# Patient Record
Sex: MALE | Race: WHITE | NOT HISPANIC OR LATINO | Employment: FULL TIME | URBAN - NONMETROPOLITAN AREA
[De-identification: names, ages, dates, MRNs, and addresses within clinical notes are randomized per-mention and may not be internally consistent; named-entity substitution may affect disease eponyms.]

---

## 2018-02-02 ENCOUNTER — OFFICE VISIT (OUTPATIENT)
Dept: RETAIL CLINIC | Facility: CLINIC | Age: 58
End: 2018-02-02

## 2018-02-02 VITALS
RESPIRATION RATE: 24 BRPM | WEIGHT: 225 LBS | TEMPERATURE: 99.9 F | HEIGHT: 72 IN | DIASTOLIC BLOOD PRESSURE: 64 MMHG | BODY MASS INDEX: 30.48 KG/M2 | HEART RATE: 99 BPM | OXYGEN SATURATION: 97 % | SYSTOLIC BLOOD PRESSURE: 112 MMHG

## 2018-02-02 DIAGNOSIS — J10.1 INFLUENZA A: Primary | ICD-10-CM

## 2018-02-02 LAB
EXPIRATION DATE: NORMAL
FLUAV AG NPH QL: NORMAL
FLUBV AG NPH QL: NORMAL
INTERNAL CONTROL: NORMAL
Lab: NORMAL

## 2018-02-02 PROCEDURE — 99213 OFFICE O/P EST LOW 20 MIN: CPT | Performed by: NURSE PRACTITIONER

## 2018-02-02 PROCEDURE — 87804 INFLUENZA ASSAY W/OPTIC: CPT | Performed by: NURSE PRACTITIONER

## 2018-02-02 RX ORDER — OSELTAMIVIR PHOSPHATE 75 MG/1
75 CAPSULE ORAL 2 TIMES DAILY
Qty: 10 CAPSULE | Refills: 0 | Status: SHIPPED | OUTPATIENT
Start: 2018-02-02 | End: 2018-02-07

## 2018-02-02 RX ORDER — AMLODIPINE BESYLATE 2.5 MG/1
2.5 TABLET ORAL DAILY
COMMUNITY
End: 2021-02-12 | Stop reason: ALTCHOICE

## 2018-02-02 RX ORDER — IBUPROFEN 800 MG/1
800 TABLET ORAL EVERY 6 HOURS PRN
Qty: 30 TABLET | Refills: 0 | Status: SHIPPED | OUTPATIENT
Start: 2018-02-02 | End: 2021-02-18

## 2018-02-02 RX ORDER — SIMVASTATIN 40 MG
40 TABLET ORAL NIGHTLY
COMMUNITY
End: 2022-05-23

## 2018-02-02 RX ORDER — ASPIRIN 81 MG/1
81 TABLET ORAL DAILY
COMMUNITY
End: 2021-02-12 | Stop reason: SDDI

## 2018-02-02 NOTE — PROGRESS NOTES
"Subjective   Fidel Roberson is a 58 y.o. male.   Chief Complaint   Patient presents with   • Flu Symptoms      Flu Symptoms   This is a new problem. The current episode started yesterday. The problem has been gradually worsening. Associated symptoms include arthralgias, chills, a fever, myalgias and weakness. Pertinent negatives include no rash. The symptoms are aggravated by exertion. He has tried rest for the symptoms. The treatment provided no relief.        The following portions of the patient's history were reviewed and updated as appropriate: allergies, current medications, past family history, past medical history, past social history, past surgical history and problem list.    Current Outpatient Prescriptions:   •  aspirin 81 MG EC tablet, Take 81 mg by mouth Daily., Disp: , Rfl:   •  simvastatin (ZOCOR) 40 MG tablet, Take 40 mg by mouth Every Night., Disp: , Rfl:   •  ibuprofen (ADVIL,MOTRIN) 800 MG tablet, Take 1 tablet by mouth Every 6 (Six) Hours As Needed for Fever., Disp: 30 tablet, Rfl: 0  •  oseltamivir (TAMIFLU) 75 MG capsule, Take 1 capsule by mouth 2 (Two) Times a Day for 5 days., Disp: 10 capsule, Rfl: 0    Review of Systems   Constitutional: Positive for activity change, appetite change, chills and fever.   HENT: Negative.    Eyes: Negative.    Respiratory: Negative.    Cardiovascular: Negative.    Gastrointestinal: Negative.    Musculoskeletal: Positive for arthralgias and myalgias.   Skin: Negative for rash.   Neurological: Positive for weakness.     /64  Pulse 99  Temp 99.9 °F (37.7 °C) (Temporal Artery )   Resp 24  Ht 182.9 cm (72\")  Wt 102 kg (225 lb)  SpO2 97%  BMI 30.52 kg/m2    Objective   No Known Allergies    Physical Exam   Constitutional: He is oriented to person, place, and time. He appears well-developed and well-nourished. He has a sickly appearance. No distress.   HENT:   Head: Normocephalic.   Right Ear: External ear normal.   Left Ear: External ear normal.   Nose: " Nose normal.   Mouth/Throat: Oropharynx is clear and moist. No oropharyngeal exudate.   Eyes: Conjunctivae are normal.   Neck: Normal range of motion. Neck supple. No JVD present. No tracheal deviation present. No thyromegaly present.   Cardiovascular: Normal rate and regular rhythm.  Exam reveals no gallop and no friction rub.    No murmur heard.  Pulmonary/Chest: Effort normal and breath sounds normal. No stridor. No respiratory distress. He has no wheezes. He has no rales. He exhibits no tenderness.   Lymphadenopathy:     He has no cervical adenopathy.   Neurological: He is alert and oriented to person, place, and time.   Skin: Skin is warm. He is not diaphoretic.   Psychiatric: He has a normal mood and affect. His behavior is normal.       Assessment/Plan   Fidel was seen today for flu symptoms.    Diagnoses and all orders for this visit:    Influenza A  -     POC Influenza A / B    Other orders  -     ibuprofen (ADVIL,MOTRIN) 800 MG tablet; Take 1 tablet by mouth Every 6 (Six) Hours As Needed for Fever.  -     oseltamivir (TAMIFLU) 75 MG capsule; Take 1 capsule by mouth 2 (Two) Times a Day for 5 days.      Results for orders placed or performed in visit on 02/02/18   POC Influenza A / B   Result Value Ref Range    Rapid Influenza A Ag POS     Rapid Influenza B Ag NEG     Internal Control Passed Passed    Lot Number [id number]     Expiration Date 5/19           An After Visit Summary was printed, reviewed, and given to the patient. Understanding verbalized and agrees with treatment plan.  If no improvement or becomes worse, follow up with primary or go to [facility]/ER.        February 2, 2018 9:15 AM

## 2019-02-27 ENCOUNTER — OFFICE VISIT (OUTPATIENT)
Dept: RETAIL CLINIC | Facility: CLINIC | Age: 59
End: 2019-02-27

## 2019-02-27 VITALS
WEIGHT: 229.2 LBS | OXYGEN SATURATION: 97 % | DIASTOLIC BLOOD PRESSURE: 80 MMHG | RESPIRATION RATE: 18 BRPM | TEMPERATURE: 100 F | SYSTOLIC BLOOD PRESSURE: 130 MMHG | HEART RATE: 89 BPM | BODY MASS INDEX: 31.09 KG/M2

## 2019-02-27 DIAGNOSIS — Z20.828 EXPOSURE TO INFLUENZA: ICD-10-CM

## 2019-02-27 DIAGNOSIS — R68.89 FLU-LIKE SYMPTOMS: Primary | ICD-10-CM

## 2019-02-27 LAB
EXPIRATION DATE: NORMAL
FLUAV AG NPH QL: NEGATIVE
FLUBV AG NPH QL: NEGATIVE
INTERNAL CONTROL: NORMAL
Lab: NORMAL

## 2019-02-27 PROCEDURE — 87804 INFLUENZA ASSAY W/OPTIC: CPT | Performed by: NURSE PRACTITIONER

## 2019-02-27 PROCEDURE — 99213 OFFICE O/P EST LOW 20 MIN: CPT | Performed by: NURSE PRACTITIONER

## 2019-02-27 RX ORDER — OSELTAMIVIR PHOSPHATE 75 MG/1
75 CAPSULE ORAL 2 TIMES DAILY
Qty: 10 CAPSULE | Refills: 0 | Status: SHIPPED | OUTPATIENT
Start: 2019-02-27 | End: 2019-03-04

## 2019-02-27 RX ORDER — LOSARTAN POTASSIUM 100 MG/1
100 TABLET ORAL DAILY
Refills: 3 | COMMUNITY
Start: 2018-12-17 | End: 2021-09-13 | Stop reason: SDUPTHER

## 2019-02-27 RX ORDER — FLUTICASONE PROPIONATE 50 MCG
SPRAY, SUSPENSION (ML) NASAL
Refills: 0 | COMMUNITY
Start: 2019-01-15 | End: 2021-02-18

## 2019-02-27 NOTE — PROGRESS NOTES
"ZHANG Roberson is a 59 y.o. male.   Chief Complaint   Patient presents with   • Fever     Low-Grade Temp/Exposure to Flu      Fever    This is a new problem. The current episode started today. The maximum temperature noted was 100 to 100.9 F. Associated symptoms include congestion, headaches and muscle aches. Pertinent negatives include no abdominal pain, coughing, diarrhea, ear pain, nausea, rash, sore throat, vomiting or wheezing. He has tried acetaminophen for the symptoms. The treatment provided mild relief.   Risk factors: sick contacts       Presents to [facility] with c/o onset of \"high temp\" of 100.4 four hours ago while at work. He has onset of body aches with headache. Has had ill contact with influenza from close family members. Concerns he has flu.    The following portions of the patient's history were reviewed and updated as appropriate: allergies, current medications, past family history, past medical history, past social history, past surgical history and problem list.    Current Outpatient Medications:   •  aspirin 81 MG EC tablet, Take 81 mg by mouth Daily., Disp: , Rfl:   •  losartan (COZAAR) 50 MG tablet, Take 50 mg by mouth Daily., Disp: , Rfl: 3  •  simvastatin (ZOCOR) 40 MG tablet, Take 40 mg by mouth Every Night., Disp: , Rfl:   •  amLODIPine (NORVASC) 2.5 MG tablet, Take 2.5 mg by mouth Daily., Disp: , Rfl:   •  fluticasone (FLONASE) 50 MCG/ACT nasal spray, , Disp: , Rfl: 0  •  ibuprofen (ADVIL,MOTRIN) 800 MG tablet, Take 1 tablet by mouth Every 6 (Six) Hours As Needed for Fever., Disp: 30 tablet, Rfl: 0  •  oseltamivir (TAMIFLU) 75 MG capsule, Take 1 capsule by mouth 2 (Two) Times a Day for 5 days., Disp: 10 capsule, Rfl: 0    No Known Allergies    Review of Systems   Constitutional: Positive for activity change, appetite change, chills, fatigue and fever.   HENT: Positive for congestion, postnasal drip and rhinorrhea. Negative for drooling, ear pain, sinus pressure, sore throat " and trouble swallowing.    Eyes: Negative for discharge.   Respiratory: Negative for cough, chest tightness, shortness of breath and wheezing.    Gastrointestinal: Negative for abdominal pain, diarrhea, nausea and vomiting.   Musculoskeletal: Positive for myalgias. Negative for neck stiffness.   Skin: Negative for color change, pallor and rash.   Allergic/Immunologic: Negative for immunocompromised state.   Neurological: Positive for headaches. Negative for dizziness.   Psychiatric/Behavioral: Negative for sleep disturbance.     Objective     Visit Vitals  /80 (BP Location: Left arm, Patient Position: Sitting, Cuff Size: Adult)   Pulse 89   Temp 100 °F (37.8 °C) (Oral)   Resp 18   Wt 104 kg (229 lb 3.2 oz)   SpO2 97%   BMI 31.09 kg/m²       Physical Exam   Constitutional: He is oriented to person, place, and time. He appears well-developed and well-nourished.  Non-toxic appearance. He has a sickly appearance.   HENT:   Head: Normocephalic.   Right Ear: Tympanic membrane is bulging. Tympanic membrane is not erythematous. Tympanic membrane mobility is normal.   Left Ear: Tympanic membrane is bulging. Tympanic membrane is not erythematous. Tympanic membrane mobility is normal.   Nose: Rhinorrhea present. No mucosal edema.   Mouth/Throat: Uvula is midline and mucous membranes are normal. Mucous membranes are not dry. Posterior oropharyngeal erythema (mild) present. No oropharyngeal exudate or tonsillar abscesses. No tonsillar exudate.   Eyes: Conjunctivae are normal. Pupils are equal, round, and reactive to light.   Neck: Normal range of motion.   Cardiovascular: Normal rate and regular rhythm.   Pulmonary/Chest: Effort normal and breath sounds normal. He has no wheezes.   Abdominal: Soft. Bowel sounds are normal. He exhibits no distension. There is no tenderness. There is no guarding.   Musculoskeletal: Normal range of motion.   Lymphadenopathy:     He has no cervical adenopathy.   Neurological: He is alert and  oriented to person, place, and time.   Skin: Skin is warm and dry.   Psychiatric: He has a normal mood and affect. His behavior is normal.     Lab Results (last 24 hours)     Procedure Component Value Units Date/Time    POC Influenza A / B [[id number]]  (Normal) Collected:  02/27/19 1733    Specimen:  Swab Updated:  02/27/19 1733     Rapid Influenza A Ag Negative     Rapid Influenza B Ag Negative     Internal Control Passed     Lot Number [id number]     Expiration Date 9/30/20        Assessment/Plan   Fidel was seen today for fever.    Diagnoses and all orders for this visit:    Flu-like symptoms  -     oseltamivir (TAMIFLU) 75 MG capsule; Take 1 capsule by mouth 2 (Two) Times a Day for 5 days.    Exposure to influenza  -     POC Influenza A / B  -     oseltamivir (TAMIFLU) 75 MG capsule; Take 1 capsule by mouth 2 (Two) Times a Day for 5 days.               Discussed results of the POC influenza screen, negative. Patient concerned he has influenza, discussed treatment options. Will treat with Tamiflu due to flu exposure and symptoms. Follow up with primary care provider if no improvement within next 7-10 days. Go to [facility] or ER if condition worsens.

## 2021-01-15 ENCOUNTER — TRANSCRIBE ORDERS (OUTPATIENT)
Dept: ADMINISTRATIVE | Facility: HOSPITAL | Age: 61
End: 2021-01-15

## 2021-01-15 DIAGNOSIS — R07.9 CHEST PAIN, UNSPECIFIED TYPE: Primary | ICD-10-CM

## 2021-01-15 DIAGNOSIS — R01.1 CARDIAC MURMUR: ICD-10-CM

## 2021-01-15 DIAGNOSIS — R10.84 GENERALIZED ABDOMINAL PAIN: ICD-10-CM

## 2021-02-04 ENCOUNTER — APPOINTMENT (OUTPATIENT)
Dept: NUCLEAR MEDICINE | Facility: HOSPITAL | Age: 61
End: 2021-02-04

## 2021-02-04 ENCOUNTER — APPOINTMENT (OUTPATIENT)
Dept: CARDIOLOGY | Facility: HOSPITAL | Age: 61
End: 2021-02-04

## 2021-02-04 ENCOUNTER — HOSPITAL ENCOUNTER (OUTPATIENT)
Dept: ULTRASOUND IMAGING | Facility: HOSPITAL | Age: 61
Discharge: HOME | End: 2021-02-04

## 2021-02-04 ENCOUNTER — HOSPITAL ENCOUNTER (OUTPATIENT)
Dept: CARDIOLOGY | Facility: HOSPITAL | Age: 61
Discharge: HOME | End: 2021-02-04

## 2021-02-04 ENCOUNTER — HOSPITAL ENCOUNTER (OUTPATIENT)
Dept: GENERAL RADIOLOGY | Facility: HOSPITAL | Age: 61
Discharge: HOME | End: 2021-02-04

## 2021-02-04 ENCOUNTER — APPOINTMENT (OUTPATIENT)
Dept: ULTRASOUND IMAGING | Facility: HOSPITAL | Age: 61
End: 2021-02-04

## 2021-02-04 DIAGNOSIS — R01.1 CARDIAC MURMUR: ICD-10-CM

## 2021-02-04 DIAGNOSIS — R10.84 GENERALIZED ABDOMINAL PAIN: ICD-10-CM

## 2021-02-04 DIAGNOSIS — R07.9 CHEST PAIN, UNSPECIFIED TYPE: ICD-10-CM

## 2021-02-04 LAB
BH CV ECHO MEAS - % IVS THICK: -2.6 %
BH CV ECHO MEAS - % LVPW THICK: 8.5 %
BH CV ECHO MEAS - ACS: 1.4 CM
BH CV ECHO MEAS - AI DEC SLOPE: 419 CM/SEC^2
BH CV ECHO MEAS - AI MAX PG: 100.8 MMHG
BH CV ECHO MEAS - AI MAX VEL: 502 CM/SEC
BH CV ECHO MEAS - AI P1/2T: 350.9 MSEC
BH CV ECHO MEAS - AO MAX PG (FULL): 117.8 MMHG
BH CV ECHO MEAS - AO MAX PG: 119.8 MMHG
BH CV ECHO MEAS - AO MEAN PG (FULL): 61.7 MMHG
BH CV ECHO MEAS - AO MEAN PG: 62.7 MMHG
BH CV ECHO MEAS - AO ROOT AREA (BSA CORRECTED): 1.4
BH CV ECHO MEAS - AO ROOT AREA: 8 CM^2
BH CV ECHO MEAS - AO ROOT DIAM: 3.2 CM
BH CV ECHO MEAS - AO V2 MAX: 546.7 CM/SEC
BH CV ECHO MEAS - AO V2 MEAN: 361 CM/SEC
BH CV ECHO MEAS - AO V2 VTI: 131 CM
BH CV ECHO MEAS - AVA(I,A): 0.41 CM^2
BH CV ECHO MEAS - AVA(I,D): 0.41 CM^2
BH CV ECHO MEAS - AVA(V,A): 0.41 CM^2
BH CV ECHO MEAS - AVA(V,D): 0.41 CM^2
BH CV ECHO MEAS - BSA(HAYCOCK): 2.3 M^2
BH CV ECHO MEAS - BSA: 2.3 M^2
BH CV ECHO MEAS - BZI_BMI: 31.1 KILOGRAMS/M^2
BH CV ECHO MEAS - BZI_METRIC_HEIGHT: 182.9 CM
BH CV ECHO MEAS - BZI_METRIC_WEIGHT: 103.9 KG
BH CV ECHO MEAS - EDV(CUBED): 125.8 ML
BH CV ECHO MEAS - EDV(MOD-SP4): 101 ML
BH CV ECHO MEAS - EDV(TEICH): 118.8 ML
BH CV ECHO MEAS - EF(CUBED): 66.8 %
BH CV ECHO MEAS - EF(MOD-SP4): 61.5 %
BH CV ECHO MEAS - EF(TEICH): 58.1 %
BH CV ECHO MEAS - ESV(CUBED): 41.8 ML
BH CV ECHO MEAS - ESV(MOD-SP4): 38.9 ML
BH CV ECHO MEAS - ESV(TEICH): 49.8 ML
BH CV ECHO MEAS - FS: 30.7 %
BH CV ECHO MEAS - IVS/LVPW: 1.1
BH CV ECHO MEAS - IVSD: 1.3 CM
BH CV ECHO MEAS - IVSS: 1.3 CM
BH CV ECHO MEAS - LA DIMENSION: 3.2 CM
BH CV ECHO MEAS - LA/AO: 1
BH CV ECHO MEAS - LV DIASTOLIC VOL/BSA (35-75): 44.8 ML/M^2
BH CV ECHO MEAS - LV MASS(C)D: 259.8 GRAMS
BH CV ECHO MEAS - LV MASS(C)DI: 115.1 GRAMS/M^2
BH CV ECHO MEAS - LV MASS(C)S: 156.5 GRAMS
BH CV ECHO MEAS - LV MASS(C)SI: 69.4 GRAMS/M^2
BH CV ECHO MEAS - LV MAX PG: 2 MMHG
BH CV ECHO MEAS - LV MEAN PG: 1 MMHG
BH CV ECHO MEAS - LV SYSTOLIC VOL/BSA (12-30): 17.2 ML/M^2
BH CV ECHO MEAS - LV V1 MAX: 70.9 CM/SEC
BH CV ECHO MEAS - LV V1 MEAN: 48.5 CM/SEC
BH CV ECHO MEAS - LV V1 VTI: 17.2 CM
BH CV ECHO MEAS - LVIDD: 5 CM
BH CV ECHO MEAS - LVIDS: 3.5 CM
BH CV ECHO MEAS - LVLD AP4: 7.2 CM
BH CV ECHO MEAS - LVLS AP4: 5.2 CM
BH CV ECHO MEAS - LVOT AREA (M): 3.1 CM^2
BH CV ECHO MEAS - LVOT AREA: 3.1 CM^2
BH CV ECHO MEAS - LVOT DIAM: 2 CM
BH CV ECHO MEAS - LVPWD: 1.2 CM
BH CV ECHO MEAS - LVPWS: 1.3 CM
BH CV ECHO MEAS - MV A MAX VEL: 119 CM/SEC
BH CV ECHO MEAS - MV E MAX VEL: 134 CM/SEC
BH CV ECHO MEAS - MV E/A: 1.1
BH CV ECHO MEAS - PA ACC TIME: 0.1 SEC
BH CV ECHO MEAS - PA PR(ACCEL): 34.5 MMHG
BH CV ECHO MEAS - SI(AO): 466.9 ML/M^2
BH CV ECHO MEAS - SI(CUBED): 37.2 ML/M^2
BH CV ECHO MEAS - SI(LVOT): 23.9 ML/M^2
BH CV ECHO MEAS - SI(MOD-SP4): 27.5 ML/M^2
BH CV ECHO MEAS - SI(TEICH): 30.6 ML/M^2
BH CV ECHO MEAS - SV(AO): 1054 ML
BH CV ECHO MEAS - SV(CUBED): 84 ML
BH CV ECHO MEAS - SV(LVOT): 54 ML
BH CV ECHO MEAS - SV(MOD-SP4): 62.1 ML
BH CV ECHO MEAS - SV(TEICH): 69 ML
MAXIMAL PREDICTED HEART RATE: 157 BPM
STRESS TARGET HR: 133 BPM

## 2021-02-04 PROCEDURE — 93306 TTE W/DOPPLER COMPLETE: CPT

## 2021-02-04 PROCEDURE — 71046 X-RAY EXAM CHEST 2 VIEWS: CPT | Performed by: RADIOLOGY

## 2021-02-04 PROCEDURE — 76700 US EXAM ABDOM COMPLETE: CPT

## 2021-02-04 PROCEDURE — 93306 TTE W/DOPPLER COMPLETE: CPT | Performed by: INTERNAL MEDICINE

## 2021-02-04 PROCEDURE — 76700 US EXAM ABDOM COMPLETE: CPT | Performed by: RADIOLOGY

## 2021-02-04 PROCEDURE — 71046 X-RAY EXAM CHEST 2 VIEWS: CPT

## 2021-02-10 ENCOUNTER — OFFICE VISIT (OUTPATIENT)
Dept: CARDIOLOGY | Facility: CLINIC | Age: 61
End: 2021-02-10

## 2021-02-10 VITALS
SYSTOLIC BLOOD PRESSURE: 120 MMHG | WEIGHT: 212.8 LBS | HEART RATE: 84 BPM | BODY MASS INDEX: 29.79 KG/M2 | HEIGHT: 71 IN | TEMPERATURE: 97.9 F | DIASTOLIC BLOOD PRESSURE: 71 MMHG | RESPIRATION RATE: 16 BRPM

## 2021-02-10 DIAGNOSIS — E78.5 DYSLIPIDEMIA: ICD-10-CM

## 2021-02-10 DIAGNOSIS — I35.0 AORTIC STENOSIS, SEVERE: Primary | ICD-10-CM

## 2021-02-10 DIAGNOSIS — I10 ESSENTIAL HYPERTENSION: ICD-10-CM

## 2021-02-10 PROCEDURE — 93000 ELECTROCARDIOGRAM COMPLETE: CPT | Performed by: INTERNAL MEDICINE

## 2021-02-10 PROCEDURE — 99204 OFFICE O/P NEW MOD 45 MIN: CPT | Performed by: INTERNAL MEDICINE

## 2021-02-10 NOTE — H&P (VIEW-ONLY)
Jonathan Fernandez MD  Fidel Roberson  1960  02/10/2021        Dear Jonathan Fernandez MD:    Subjective     Fidel Roberson is a 61 y.o. male with the problems as listed above, presents    Chief complaint: Recent diagnosis of severe aortic stenosis    History of Present Illness: Mrs. Valdes is a pleasant 61-year-old  male with history of hypertension and dyslipidemia but no previous history of significant heart problems, had a recent echo Doppler study in which revealed severe aortic stenosis with calcification of the aortic valve leaflets.  He was also noted to have moderate aortic incompetence and mild to moderate mitral regurgitation.  His hence referred to us for further cardiac evaluation management.  On further questioning he states that he stays fairly active.  He works at [employer] and does lifting of heavy stuff at his job with apparently no significant limitation in his activities.  He has noticed some shortness of breath recently with strenuous work.  He has had some mild dizziness recently but no syncope.  He denies any chest pains.  He has no history of known coronary artery disease.  He has a strong family history of premature coronary artery disease with his dad reportedly having her heart attack and  at 60 years of age and his mom also had heart attacks and heart problems in her 60s.  He is nondiabetic.  He has a previous history of smoking of about a pack a day for 30 to 35 years but quit 12 years ago.  He has remote history of testicular cancer for which he had surgery when he was in his 20s.  He subsequently had bowel obstruction about 20 years later which required surgery.    Fidel Roberson  Echo Complete w/Doppler and Color Flow  Order# [id number]  Reading physician:   Emre Guzmán MD Ordering physician:   Jonathan Fernandez MD Study date: 21   Patient Information    Patient Name   Fidel Roberson MRN   [id number] Sex   Male  (Age)   1960 (61 y.o.)      Interpretation Summary    · Normal left ventricular cavity size noted. Left ventricular wall thickness is consistent with mild concentric hypertrophy.  · All left ventricular wall segments contract normally  · Left ventricular ejection fraction appears to be 61 - 65%.  · Left ventricular diastolic function is consistent with (grade II w/high LAP) pseudonormalization.  · The aortic valve is not well visualized. The aortic valve is abnormal in structure. There is severe calcification of the aortic valve. Mild to moderate aortic valve regurgitation is present. Severe aortic valve stenosis is present.  · Stenosis Ao max .8 mmHg Ao mean PG 62.7 mmHg Ao V2  cm J CARLOS(I,D) 0.41 cm^2  · There are myxomatous changes of the mitral valve apparatus present. There is anterior mitral leaflet thickening present. Mild to moderate mitral valve regurgitation is present. No significant mitral valve stenosis is present.  · There is no evidence of pericardial effusion.  · Comments: Patient seems to have critical aortic stenosis and may have to be considered for aortic valve replacement in the near future.        Cardiac risk factors:hypercholesterolemia, hypertension, smoking, Positive family Hx. of premature athersclerotivc disease. and Age and male gender.    No Known Allergies:      Current Outpatient Medications:   •  ibuprofen (ADVIL,MOTRIN) 800 MG tablet, Take 1 tablet by mouth Every 6 (Six) Hours As Needed for Fever., Disp: 30 tablet, Rfl: 0  •  losartan (COZAAR) 50 MG tablet, Take 100 mg by mouth Daily., Disp: , Rfl: 3  •  simvastatin (ZOCOR) 40 MG tablet, Take 40 mg by mouth Every Night., Disp: , Rfl:   •  amLODIPine (NORVASC) 2.5 MG tablet, Take 2.5 mg by mouth Daily., Disp: , Rfl:   •  aspirin 81 MG EC tablet, Take 81 mg by mouth Daily., Disp: , Rfl:   •  fluticasone (FLONASE) 50 MCG/ACT nasal spray, , Disp: , Rfl: 0    Past Medical History:   Diagnosis Date   • Cancer (CMS/HCC)     testicular   • Hyperlipidemia   "  • Hypertension      Past Surgical History:   Procedure Laterality Date   • SMALL INTESTINE SURGERY     • TESTICLE SURGERY     • TUMOR REMOVAL       Family History   Problem Relation Age of Onset   • Diabetes Mother    • Heart disease Mother    • Heart disease Father      Social History     Tobacco Use   • Smoking status: Former Smoker     Packs/day: 1.00     Years: 30.00     Pack years: 30.00     Types: Cigarettes     Quit date: 2010     Years since quittin.0   • Smokeless tobacco: Never Used   Substance Use Topics   • Alcohol use: No   • Drug use: No       Review of Systems   Constitution: Negative for chills, diaphoresis and fever.   Eyes: Positive for visual disturbance.   Cardiovascular: Negative for chest pain, leg swelling, orthopnea, palpitations and paroxysmal nocturnal dyspnea.   Respiratory: Positive for shortness of breath. Negative for cough and hemoptysis.    Endocrine: Negative for cold intolerance and heat intolerance.   Hematologic/Lymphatic: Does not bruise/bleed easily.   Skin: Negative for rash.   Musculoskeletal: Negative for myalgias.   Gastrointestinal: Positive for abdominal pain and heartburn. Negative for constipation, diarrhea, nausea and vomiting.   Genitourinary: Negative for dysuria and hematuria.   Neurological: Positive for dizziness. Negative for focal weakness and numbness.       Objective   Blood pressure 120/71, pulse 84, temperature 97.9 °F (36.6 °C), resp. rate 16, height 180.3 cm (71\"), weight 96.5 kg (212 lb 12.8 oz).  Body mass index is 29.68 kg/m².    Constitutional:       Appearance: Well-developed.   Eyes:      Pupils: Pupils are equal, round, and reactive to light.   Neck:      Musculoskeletal: Neck supple.      Thyroid: No thyromegaly.      Vascular: No JVD.      Trachea: No tracheal deviation.      Lymphadenopathy: No cervical adenopathy.   Pulmonary:      Effort: Pulmonary effort is normal.      Breath sounds: Normal breath sounds.   Cardiovascular:      PMI " at left midclavicular line. Normal rate. Regular rhythm. Normal S1. Normal S2.      Murmurs: There is a grade 4/6 harsh systolic murmur at the URSB and LLSB, radiating to the neck.      No gallop. No click. No rub.   Pulses:     Intact distal pulses.   Edema:     Peripheral edema absent.   Abdominal:      General: Bowel sounds are normal.      Palpations: Abdomen is soft. There is no abdominal mass.      Tenderness: There is no abdominal tenderness.   Musculoskeletal: Normal range of motion.   Skin:     General: Skin is warm and dry.      Findings: No rash.   Neurological:      Mental Status: Alert and oriented to person, place, and time.           ECG 12 Lead    Date/Time: 2/10/2021 2:04 PM  Performed by: Emre Guzmán MD  Authorized by: Emre Guzmán MD   Previous ECG: no previous ECG available  Rhythm: sinus rhythm  Conduction: conduction normal  ST Segments: ST segments normal  T Waves: T waves normal  Other findings: non-specific ST-T wave changes              Assessment/Plan :   Diagnosis Plan   1. Aortic stenosis, severe  Adult Transesophageal Echo   2. Essential hypertension     3. Dyslipidemia          Recommendations:  Orders Placed This Encounter   Procedures   • ECG 12 Lead   • Adult Transesophageal Echo (NIKKO) W/ Cont if Necessary Per Protocol        1. Although the transthoracic echo echo Doppler study reveals severe aortic stenosis, patient seems to be relatively asymptomatic at this time and does not have evidence of left ventricle hypertrophy proportionate to the degree of aortic stenosis.  Hence I have discussed with him about the option of transesophageal echocardiographic study to have a better look at the aortic valve on to be able to assess the degree of aortic stenosis better.  I have discussed the procedure and potential risks and benefits and alternatives.  He expressed understanding and wanted to proceed.  2. We will go ahead and schedule this for next Thursday.    Return in about 4  weeks (around 3/10/2021).    As always, [facility]  I appreciate very much the opportunity to participate in the cardiovascular care of your patients. Please do not hesitate to call me with any questions with regards to Fidel Roberson's evaluation and management.       With Best Regards,        Emre Guzmán MD, [facility]    Please note that portions of this note were completed with a voice recognition program.

## 2021-02-10 NOTE — PROGRESS NOTES
Jonathan Fernandez MD  Fidel Roberson  1960  02/10/2021        Dear Jonathan Fernandez MD:    Subjective     Fidel Roberson is a 61 y.o. male with the problems as listed above, presents    Chief complaint: Recent diagnosis of severe aortic stenosis    History of Present Illness: Mrs. Valdes is a pleasant 61-year-old  male with history of hypertension and dyslipidemia but no previous history of significant heart problems, had a recent echo Doppler study in which revealed severe aortic stenosis with calcification of the aortic valve leaflets.  He was also noted to have moderate aortic incompetence and mild to moderate mitral regurgitation.  His hence referred to us for further cardiac evaluation management.  On further questioning he states that he stays fairly active.  He works at [employer] and does lifting of heavy stuff at his job with apparently no significant limitation in his activities.  He has noticed some shortness of breath recently with strenuous work.  He has had some mild dizziness recently but no syncope.  He denies any chest pains.  He has no history of known coronary artery disease.  He has a strong family history of premature coronary artery disease with his dad reportedly having her heart attack and  at 60 years of age and his mom also had heart attacks and heart problems in her 60s.  He is nondiabetic.  He has a previous history of smoking of about a pack a day for 30 to 35 years but quit 12 years ago.  He has remote history of testicular cancer for which he had surgery when he was in his 20s.  He subsequently had bowel obstruction about 20 years later which required surgery.    Fidel Roberson  Echo Complete w/Doppler and Color Flow  Order# [id number]  Reading physician:   Emre Guzmán MD Ordering physician:   Jonathan Fernandez MD Study date: 21   Patient Information    Patient Name   Fidel Roberson MRN   [id number] Sex   Male  (Age)   1960 (61 y.o.)    Interpretation Summary    · Normal left ventricular cavity size noted. Left ventricular wall thickness is consistent with mild concentric hypertrophy.  · All left ventricular wall segments contract normally  · Left ventricular ejection fraction appears to be 61 - 65%.  · Left ventricular diastolic function is consistent with (grade II w/high LAP) pseudonormalization.  · The aortic valve is not well visualized. The aortic valve is abnormal in structure. There is severe calcification of the aortic valve. Mild to moderate aortic valve regurgitation is present. Severe aortic valve stenosis is present.  · Stenosis Ao max .8 mmHg Ao mean PG 62.7 mmHg Ao V2  cm J CARLOS(I,D) 0.41 cm^2  · There are myxomatous changes of the mitral valve apparatus present. There is anterior mitral leaflet thickening present. Mild to moderate mitral valve regurgitation is present. No significant mitral valve stenosis is present.  · There is no evidence of pericardial effusion.  · Comments: Patient seems to have critical aortic stenosis and may have to be considered for aortic valve replacement in the near future.        Cardiac risk factors:hypercholesterolemia, hypertension, smoking, Positive family Hx. of premature athersclerotivc disease. and Age and male gender.    No Known Allergies:      Current Outpatient Medications:   •  ibuprofen (ADVIL,MOTRIN) 800 MG tablet, Take 1 tablet by mouth Every 6 (Six) Hours As Needed for Fever., Disp: 30 tablet, Rfl: 0  •  losartan (COZAAR) 50 MG tablet, Take 100 mg by mouth Daily., Disp: , Rfl: 3  •  simvastatin (ZOCOR) 40 MG tablet, Take 40 mg by mouth Every Night., Disp: , Rfl:   •  amLODIPine (NORVASC) 2.5 MG tablet, Take 2.5 mg by mouth Daily., Disp: , Rfl:   •  aspirin 81 MG EC tablet, Take 81 mg by mouth Daily., Disp: , Rfl:   •  fluticasone (FLONASE) 50 MCG/ACT nasal spray, , Disp: , Rfl: 0    Past Medical History:   Diagnosis Date   • Cancer (CMS/HCC)     testicular   • Hyperlipidemia   "  • Hypertension      Past Surgical History:   Procedure Laterality Date   • SMALL INTESTINE SURGERY     • TESTICLE SURGERY     • TUMOR REMOVAL       Family History   Problem Relation Age of Onset   • Diabetes Mother    • Heart disease Mother    • Heart disease Father      Social History     Tobacco Use   • Smoking status: Former Smoker     Packs/day: 1.00     Years: 30.00     Pack years: 30.00     Types: Cigarettes     Quit date: 2010     Years since quittin.0   • Smokeless tobacco: Never Used   Substance Use Topics   • Alcohol use: No   • Drug use: No       Review of Systems   Constitution: Negative for chills, diaphoresis and fever.   Eyes: Positive for visual disturbance.   Cardiovascular: Negative for chest pain, leg swelling, orthopnea, palpitations and paroxysmal nocturnal dyspnea.   Respiratory: Positive for shortness of breath. Negative for cough and hemoptysis.    Endocrine: Negative for cold intolerance and heat intolerance.   Hematologic/Lymphatic: Does not bruise/bleed easily.   Skin: Negative for rash.   Musculoskeletal: Negative for myalgias.   Gastrointestinal: Positive for abdominal pain and heartburn. Negative for constipation, diarrhea, nausea and vomiting.   Genitourinary: Negative for dysuria and hematuria.   Neurological: Positive for dizziness. Negative for focal weakness and numbness.       Objective   Blood pressure 120/71, pulse 84, temperature 97.9 °F (36.6 °C), resp. rate 16, height 180.3 cm (71\"), weight 96.5 kg (212 lb 12.8 oz).  Body mass index is 29.68 kg/m².    Constitutional:       Appearance: Well-developed.   Eyes:      Pupils: Pupils are equal, round, and reactive to light.   Neck:      Musculoskeletal: Neck supple.      Thyroid: No thyromegaly.      Vascular: No JVD.      Trachea: No tracheal deviation.      Lymphadenopathy: No cervical adenopathy.   Pulmonary:      Effort: Pulmonary effort is normal.      Breath sounds: Normal breath sounds.   Cardiovascular:      PMI " at left midclavicular line. Normal rate. Regular rhythm. Normal S1. Normal S2.      Murmurs: There is a grade 4/6 harsh systolic murmur at the URSB and LLSB, radiating to the neck.      No gallop. No click. No rub.   Pulses:     Intact distal pulses.   Edema:     Peripheral edema absent.   Abdominal:      General: Bowel sounds are normal.      Palpations: Abdomen is soft. There is no abdominal mass.      Tenderness: There is no abdominal tenderness.   Musculoskeletal: Normal range of motion.   Skin:     General: Skin is warm and dry.      Findings: No rash.   Neurological:      Mental Status: Alert and oriented to person, place, and time.           ECG 12 Lead    Date/Time: 2/10/2021 2:04 PM  Performed by: Emre Guzmán MD  Authorized by: Emre Guzmán MD   Previous ECG: no previous ECG available  Rhythm: sinus rhythm  Conduction: conduction normal  ST Segments: ST segments normal  T Waves: T waves normal  Other findings: non-specific ST-T wave changes              Assessment/Plan :   Diagnosis Plan   1. Aortic stenosis, severe  Adult Transesophageal Echo   2. Essential hypertension     3. Dyslipidemia          Recommendations:  Orders Placed This Encounter   Procedures   • ECG 12 Lead   • Adult Transesophageal Echo (NIKKO) W/ Cont if Necessary Per Protocol        1. Although the transthoracic echo echo Doppler study reveals severe aortic stenosis, patient seems to be relatively asymptomatic at this time and does not have evidence of left ventricle hypertrophy proportionate to the degree of aortic stenosis.  Hence I have discussed with him about the option of transesophageal echocardiographic study to have a better look at the aortic valve on to be able to assess the degree of aortic stenosis better.  I have discussed the procedure and potential risks and benefits and alternatives.  He expressed understanding and wanted to proceed.  2. We will go ahead and schedule this for next Thursday.    Return in about 4  weeks (around 3/10/2021).    As always, Kendall  I appreciate very much the opportunity to participate in the cardiovascular care of your patients. Please do not hesitate to call me with any questions with regards to Fidel Roberson's evaluation and management.       With Best Regards,        Emre Guzmán MD, [facility]    Please note that portions of this note were completed with a voice recognition program.

## 2021-02-12 ENCOUNTER — TRANSCRIBE ORDERS (OUTPATIENT)
Dept: CARDIOLOGY | Facility: CLINIC | Age: 61
End: 2021-02-12

## 2021-02-15 ENCOUNTER — TRANSCRIBE ORDERS (OUTPATIENT)
Dept: ADMINISTRATIVE | Facility: HOSPITAL | Age: 61
End: 2021-02-15

## 2021-02-15 DIAGNOSIS — Z01.818 OTHER SPECIFIED PRE-OPERATIVE EXAMINATION: Primary | ICD-10-CM

## 2021-02-16 ENCOUNTER — LAB (OUTPATIENT)
Dept: LAB | Facility: HOSPITAL | Age: 61
End: 2021-02-16

## 2021-02-16 PROCEDURE — C9803 HOPD COVID-19 SPEC COLLECT: HCPCS | Performed by: INTERNAL MEDICINE

## 2021-02-16 PROCEDURE — U0004 COV-19 TEST NON-CDC HGH THRU: HCPCS | Performed by: INTERNAL MEDICINE

## 2021-02-17 LAB — SARS-COV-2 RNA RESP QL NAA+PROBE: NOT DETECTED

## 2021-02-18 ENCOUNTER — ANESTHESIA (OUTPATIENT)
Dept: CARDIOLOGY | Facility: HOSPITAL | Age: 61
End: 2021-02-18

## 2021-02-18 ENCOUNTER — ANESTHESIA EVENT (OUTPATIENT)
Dept: CARDIOLOGY | Facility: HOSPITAL | Age: 61
End: 2021-02-18

## 2021-02-18 ENCOUNTER — HOSPITAL ENCOUNTER (OUTPATIENT)
Dept: CARDIOLOGY | Facility: HOSPITAL | Age: 61
Discharge: HOME | End: 2021-02-18

## 2021-02-18 VITALS
WEIGHT: 210 LBS | TEMPERATURE: 97.9 F | OXYGEN SATURATION: 95 % | DIASTOLIC BLOOD PRESSURE: 70 MMHG | BODY MASS INDEX: 28.44 KG/M2 | HEART RATE: 75 BPM | RESPIRATION RATE: 18 BRPM | HEIGHT: 72 IN | SYSTOLIC BLOOD PRESSURE: 136 MMHG

## 2021-02-18 DIAGNOSIS — I35.0 AORTIC STENOSIS, SEVERE: ICD-10-CM

## 2021-02-18 PROCEDURE — 93321 DOPPLER ECHO F-UP/LMTD STD: CPT | Performed by: INTERNAL MEDICINE

## 2021-02-18 PROCEDURE — 25010000002 PROPOFOL 10 MG/ML EMULSION: Performed by: NURSE ANESTHETIST, CERTIFIED REGISTERED

## 2021-02-18 PROCEDURE — 93312 ECHO TRANSESOPHAGEAL: CPT | Performed by: INTERNAL MEDICINE

## 2021-02-18 PROCEDURE — 93312 ECHO TRANSESOPHAGEAL: CPT

## 2021-02-18 PROCEDURE — 93325 DOPPLER ECHO COLOR FLOW MAPG: CPT

## 2021-02-18 PROCEDURE — 93321 DOPPLER ECHO F-UP/LMTD STD: CPT

## 2021-02-18 PROCEDURE — 93325 DOPPLER ECHO COLOR FLOW MAPG: CPT | Performed by: INTERNAL MEDICINE

## 2021-02-18 RX ORDER — LIDOCAINE HYDROCHLORIDE 20 MG/ML
INJECTION, SOLUTION INFILTRATION; PERINEURAL AS NEEDED
Status: DISCONTINUED | OUTPATIENT
Start: 2021-02-18 | End: 2021-02-18 | Stop reason: SURG

## 2021-02-18 RX ORDER — SODIUM CHLORIDE 9 MG/ML
INJECTION, SOLUTION INTRAVENOUS CONTINUOUS PRN
Status: DISCONTINUED | OUTPATIENT
Start: 2021-02-18 | End: 2021-02-18 | Stop reason: SURG

## 2021-02-18 RX ORDER — PROPOFOL 10 MG/ML
VIAL (ML) INTRAVENOUS AS NEEDED
Status: DISCONTINUED | OUTPATIENT
Start: 2021-02-18 | End: 2021-02-18 | Stop reason: SURG

## 2021-02-18 RX ADMIN — SODIUM CHLORIDE: 0.9 INJECTION, SOLUTION INTRAVENOUS at 11:41

## 2021-02-18 RX ADMIN — PROPOFOL 30 MG: 10 INJECTION, EMULSION INTRAVENOUS at 11:48

## 2021-02-18 RX ADMIN — PROPOFOL 150 MCG/KG/MIN: 10 INJECTION, EMULSION INTRAVENOUS at 11:48

## 2021-02-18 RX ADMIN — LIDOCAINE HYDROCHLORIDE 30 MG: 20 INJECTION, SOLUTION INFILTRATION; PERINEURAL at 11:48

## 2021-02-18 NOTE — ANESTHESIA PREPROCEDURE EVALUATION
Anesthesia Evaluation     Patient summary reviewed and Nursing notes reviewed   no history of anesthetic complications:  NPO Solid Status: > 8 hours  NPO Liquid Status: > 8 hours           Airway   Mallampati: II  TM distance: >3 FB  Neck ROM: full  No difficulty expected  Dental      Pulmonary - normal exam    breath sounds clear to auscultation  (+) a smoker Former,   Cardiovascular     ECG reviewed  Rhythm: regular  Rate: normal    (+) hypertension, valvular problems/murmurs (critical AS) AS, YUN, murmur, systolic click, hyperlipidemia,       Neuro/Psych- negative ROS  GI/Hepatic/Renal/Endo - negative ROS     Musculoskeletal (-) negative ROS    Abdominal  - normal exam   Substance History - negative use     OB/GYN negative ob/gyn ROS         Other      history of cancer remission    ROS/Med Hx Other: Echo 1/15/2021:  ·Normal left ventricular cavity size noted. Left ventricular wall thickness is consistent with mild concentric hypertrophy.  ·All left ventricular wall segments contract normally  Left ventricular ejection fraction appears to be 61 - 65%.  ·Left ventricular diastolic function is consistent with (grade II w/high LAP) pseudonormalization.  ·The aortic valve is not well visualized. The aortic valve is abnormal in structure. There is severe calcification of the aortic valve. Mild to moderate aortic valve regurgitation is present. Severe aortic valve stenosis is present.  ·Stenosis Ao max .8 mmHg Ao mean PG 62.7 mmHg Ao V2  cm J CARLOS(I,D) 0.41 cm^2  ·There are myxomatous changes of the mitral valve apparatus present. There is anterior mitral leaflet thickening present. Mild to moderate mitral valve regurgitation is present. No significant mitral valve stenosis is present.  ·There is no evidence of pericardial effusion.  ·Comments: Patient seems to have critical aortic stenosis and may have to be considered for aortic valve replacement in the near future.                     Anesthesia  Plan    ASA 3     general   total IV anesthesia  intravenous induction     Anesthetic plan, all risks, benefits, and alternatives have been provided, discussed and informed consent has been obtained with: patient.    Plan discussed with CRNA.

## 2021-02-18 NOTE — ANESTHESIA POSTPROCEDURE EVALUATION
Patient: Fidel Roberson    Procedure Summary     Date: 02/18/21 Room / Location: [facility] NONINVASIVE LAB    Anesthesia Start: 1141 Anesthesia Stop: 1204    Procedure: ADULT TRANSESOPHAGEAL ECHO (NIKKO) W/ CONT IF NECESSARY PER PROTOCOL Diagnosis:       Aortic stenosis, severe      (Valvular Disease)      (Aortic Valve Assessment)    Scheduled Providers: Emre Guzmán MD Provider: Dev Nguyen DO    Anesthesia Type: general ASA Status: 3          Anesthesia Type: general    Vitals  Vitals Value Taken Time   /70 02/18/21 1230   Temp     Pulse 75 02/18/21 1230   Resp 18 02/18/21 1230   SpO2 95 % 02/18/21 1230           Post Anesthesia Care and Evaluation    Patient location during evaluation: PHASE II  Patient participation: complete - patient participated  Level of consciousness: awake and alert  Pain score: 0  Pain management: adequate  Airway patency: patent  Anesthetic complications: No anesthetic complications  PONV Status: controlled  Cardiovascular status: acceptable  Respiratory status: acceptable and nasal cannula  Hydration status: euvolemic  No anesthesia care post op

## 2021-02-19 LAB
BH CV ECHO MEAS - AO MAX PG (FULL): 77.5 MMHG
BH CV ECHO MEAS - AO MAX PG: 82.8 MMHG
BH CV ECHO MEAS - AO MEAN PG (FULL): 39 MMHG
BH CV ECHO MEAS - AO MEAN PG: 42 MMHG
BH CV ECHO MEAS - AO V2 MAX: 455 CM/SEC
BH CV ECHO MEAS - AO V2 MEAN: 282 CM/SEC
BH CV ECHO MEAS - AO V2 VTI: 106 CM
BH CV ECHO MEAS - AVA(I,A): 0.85 CM^2
BH CV ECHO MEAS - AVA(I,D): 0.85 CM^2
BH CV ECHO MEAS - AVA(V,A): 0.79 CM^2
BH CV ECHO MEAS - AVA(V,D): 0.79 CM^2
BH CV ECHO MEAS - BSA(HAYCOCK): 2.2 M^2
BH CV ECHO MEAS - BSA: 2.2 M^2
BH CV ECHO MEAS - BZI_BMI: 29.6 KILOGRAMS/M^2
BH CV ECHO MEAS - BZI_METRIC_HEIGHT: 180.3 CM
BH CV ECHO MEAS - BZI_METRIC_WEIGHT: 96.2 KG
BH CV ECHO MEAS - LV MAX PG: 5.3 MMHG
BH CV ECHO MEAS - LV MEAN PG: 3 MMHG
BH CV ECHO MEAS - LV V1 MAX: 115 CM/SEC
BH CV ECHO MEAS - LV V1 MEAN: 72.9 CM/SEC
BH CV ECHO MEAS - LV V1 VTI: 28.7 CM
BH CV ECHO MEAS - LVOT AREA (M): 3.1 CM^2
BH CV ECHO MEAS - LVOT AREA: 3.1 CM^2
BH CV ECHO MEAS - LVOT DIAM: 2 CM
BH CV ECHO MEAS - SI(LVOT): 41.7 ML/M^2
BH CV ECHO MEAS - SV(LVOT): 90.2 ML

## 2021-02-22 DIAGNOSIS — I35.0 AORTIC STENOSIS, SEVERE: Primary | ICD-10-CM

## 2021-02-22 DIAGNOSIS — Z23 IMMUNIZATION DUE: ICD-10-CM

## 2021-02-26 ENCOUNTER — IMMUNIZATION (OUTPATIENT)
Dept: VACCINE CLINIC | Facility: HOSPITAL | Age: 61
End: 2021-02-26

## 2021-02-26 DIAGNOSIS — Z23 IMMUNIZATION DUE: ICD-10-CM

## 2021-02-26 PROCEDURE — 91300 HC SARSCOV02 VAC 30MCG/0.3ML IM: CPT | Performed by: INTERNAL MEDICINE

## 2021-02-26 PROCEDURE — 0001A: CPT | Performed by: INTERNAL MEDICINE

## 2021-03-03 ENCOUNTER — OFFICE VISIT (OUTPATIENT)
Dept: CARDIAC SURGERY | Facility: CLINIC | Age: 61
End: 2021-03-03

## 2021-03-03 VITALS
WEIGHT: 211.2 LBS | TEMPERATURE: 97.1 F | HEIGHT: 71 IN | HEART RATE: 78 BPM | DIASTOLIC BLOOD PRESSURE: 82 MMHG | SYSTOLIC BLOOD PRESSURE: 138 MMHG | BODY MASS INDEX: 29.57 KG/M2 | OXYGEN SATURATION: 98 %

## 2021-03-03 DIAGNOSIS — I35.0 NONRHEUMATIC AORTIC VALVE STENOSIS: Primary | ICD-10-CM

## 2021-03-03 DIAGNOSIS — I10 ESSENTIAL HYPERTENSION: ICD-10-CM

## 2021-03-03 PROCEDURE — 99204 OFFICE O/P NEW MOD 45 MIN: CPT | Performed by: THORACIC SURGERY (CARDIOTHORACIC VASCULAR SURGERY)

## 2021-03-03 RX ORDER — ASPIRIN 81 MG/1
162 TABLET ORAL DAILY
COMMUNITY

## 2021-03-03 RX ORDER — CHLORAL HYDRATE 500 MG
CAPSULE ORAL DAILY
COMMUNITY

## 2021-03-03 RX ORDER — UBIDECARENONE 100 MG
200 CAPSULE ORAL DAILY
COMMUNITY

## 2021-03-03 NOTE — PROGRESS NOTES
03/03/2021  Patient Information  Fidel Roberson                                                                                          [address]  [place] [address]   1960  'PCP/Referring Physician'  Jonathan Fernandez MD  [phone]  Jesse Marcano PA-C  [phone]  Chief Complaint   Patient presents with   • Aortic Stenosis     New patient per REENA Mckeon for aortic stenosis       History of Present Illness: 61-year-old  male with a history of hypertension, hyperlipidemia, testicular cancer and remote tobacco abuse who presents with fatigue.  For the past 6 months, the patient notes decreased exercise tolerance.  He works in the [place] yard at [employer] and is fairly active.  His daughter notes decreased activity and stamina as compared to last year.  He does have mild shortness of breath with exertion.  When mowing his daughter's yard, he has fatigue and shortness of breath.  The patient denies chest pain, but does note occasional palpitations.  Mr. Roberson denies dizziness or syncope.      There is no problem list on file for this patient.    Past Medical History:   Diagnosis Date   • Cancer (CMS/HCC)     testicular   • Hyperlipidemia    • Hypertension      Past Surgical History:   Procedure Laterality Date   • SMALL INTESTINE SURGERY     • TESTICLE SURGERY     • TUMOR REMOVAL         Current Outpatient Medications:   •  aspirin 81 MG EC tablet, Take 81 mg by mouth Daily., Disp: , Rfl:   •  coenzyme Q10 100 MG capsule, Take 100 mg by mouth Daily., Disp: , Rfl:   •  losartan (COZAAR) 50 MG tablet, Take 100 mg by mouth Daily., Disp: , Rfl: 3  •  Omega-3 1000 MG capsule, Take  by mouth., Disp: , Rfl:   •  simvastatin (ZOCOR) 40 MG tablet, Take 40 mg by mouth Every Night., Disp: , Rfl:   No Known Allergies  Social History     Socioeconomic History   • Marital status:      Spouse name: Not on file   • Number of children: 1   • Years of education: Not on file   • Highest  "education level: Not on file   Occupational History     Comment: Working   Tobacco Use   • Smoking status: Former Smoker     Packs/day: 1.00     Years: 30.00     Pack years: 30.00     Types: Cigarettes     Quit date: 2010     Years since quittin.0   • Smokeless tobacco: Never Used   Substance and Sexual Activity   • Alcohol use: No   • Drug use: No   • Sexual activity: Defer   Social History Narrative    Pt lives in [place], [place] with wife.      Family History   Problem Relation Age of Onset   • Diabetes Mother    • Heart disease Mother    • Heart disease Father      Review of Systems   Constitution: Negative for chills, fever, malaise/fatigue, night sweats and weight loss.   HENT: Negative for hearing loss, odynophagia and sore throat.    Cardiovascular: Negative for chest pain, dyspnea on exertion, leg swelling, orthopnea and palpitations.   Respiratory: Negative for cough and hemoptysis.    Endocrine: Negative for cold intolerance, heat intolerance, polydipsia, polyphagia and polyuria.   Hematologic/Lymphatic: Does not bruise/bleed easily.   Skin: Negative for itching and rash.   Musculoskeletal: Negative for joint pain, joint swelling and myalgias.   Gastrointestinal: Negative for abdominal pain, constipation, diarrhea, hematemesis, hematochezia, melena, nausea and vomiting.   Genitourinary: Negative for dysuria, frequency and hematuria.   Neurological: Negative for focal weakness, headaches, numbness and seizures.   Psychiatric/Behavioral: Negative for suicidal ideas.   All other systems reviewed and are negative.    Vitals:    21 1050   BP: 138/82   BP Location: Right arm   Patient Position: Sitting   Pulse: 78   Temp: 97.1 °F (36.2 °C)   SpO2: 98%   Weight: 95.8 kg (211 lb 3.2 oz)   Height: 180.3 cm (71\")      Physical Exam  Constitutional:       General: He is not in acute distress.     Appearance: He is well-developed. He is not diaphoretic.      Comments:  male who appears stated age " and is present with his daughter   HENT:      Head: Normocephalic and atraumatic.   Eyes:      General: No scleral icterus.     Conjunctiva/sclera: Conjunctivae normal.   Neck:      Musculoskeletal: Neck supple.      Vascular: No JVD.      Trachea: No tracheal deviation.      Comments: No carotid bruits bilaterally  Cardiovascular:      Rate and Rhythm: Normal rate and regular rhythm.      Heart sounds: Murmur present. No friction rub. No gallop.       Comments: III/VI systolic ejection murmur at right sternal border  Pulmonary:      Effort: Pulmonary effort is normal. No respiratory distress.      Breath sounds: Normal breath sounds. No wheezing or rales.   Abdominal:      General: There is no distension.      Palpations: Abdomen is soft. There is no mass.      Tenderness: There is no abdominal tenderness. There is no guarding or rebound.   Musculoskeletal: Normal range of motion.   Skin:     General: Skin is warm and dry.      Findings: No erythema or rash.   Neurological:      Mental Status: He is alert and oriented to person, place, and time.   Psychiatric:         Behavior: Behavior normal.         Thought Content: Thought content normal.         Judgment: Judgment normal.         The ROS, past medical history, surgical history, family history, social history and vitals were reviewed by myself and corrected as needed.      Labs/Imaging:  -Transesophageal echocardiogram performed 2/18/2021, personally reviewed, demonstrates EF 66 to 70%, mild to moderate left ventricular concentric hypertrophy, moderate aortic regurgitation and severe aortic valve stenosis with an aortic valve area 0.85 cm², peak velocity 455 cm/s, max gradient of 82.8 mmHg mean gradient of 42 mmHg.  There is mild mitral and tricuspid regurgitation  -Transthoracic echocardiogram performed 2/4/2021, personally reviewed, demonstrates EF 61 to 65%, mild concentric left ventricular hypertrophy, grade 2 left ventricular diastolic dysfunction, mild to  moderate aortic regurgitation, severe aortic valve stenosis and mild to moderate mitral regurgitation.    Assessment/Plan:  61-year-old  male with a history of hypertension, hyperlipidemia, testicular cancer and remote tobacco abuse who presents with fatigue and mild exertional dyspnea.  The patient has symptomatic severe aortic valve stenosis.  We discussed options including surgical aortic valve replacement versus transcatheter aortic valve replacement.  The patient wished to proceed with further work-up for possible transcatheter aortic valve replacement.  He will need cardiac catheterization to rule out significant coronary artery disease, CT TAVR protocol and carotid duplex.  I discussed this case with SPRING Kelley who will arrange this work-up.  Assuming these studies are satisfactory, the patient is a reasonable candidate for transcatheter aortic valve replacement.  The risks and benefits of the procedure were discussed with the patient including pain, bleeding, infection, stroke, heart block requiring a pacemaker, renal dysfunction/failure, myocardial infarction and death.  The patient understood these risks and wished to proceed with further work-up.    There is no problem list on file for this patient.

## 2021-03-03 NOTE — PROGRESS NOTES
TAVR SPRING    Patient seen @ [facility] today.  Per Dr. Sandoval, patient agreeable to TAVR evaluation for severe symptomatic aortic stenosis.  Placed order for CTA TAVR protocol.  Patient requests cath in [place].  Spoke with Dr. Guzmán's nurse, Jackie and requested orders placed for cath case/carotid @ [facility].      Will see patient when here in [place] for CTA and discuss details of procedure date, etc.  Mailed out TAVR educational brochure and [facility] Shared Decision Making Tool.        Shirley LONDONO

## 2021-03-04 ENCOUNTER — PREP FOR SURGERY (OUTPATIENT)
Dept: OTHER | Facility: HOSPITAL | Age: 61
End: 2021-03-04

## 2021-03-04 ENCOUNTER — TELEPHONE (OUTPATIENT)
Dept: CARDIOLOGY | Facility: HOSPITAL | Age: 61
End: 2021-03-04

## 2021-03-04 ENCOUNTER — OFFICE VISIT (OUTPATIENT)
Dept: CARDIOLOGY | Facility: CLINIC | Age: 61
End: 2021-03-04

## 2021-03-04 VITALS — HEART RATE: 87 BPM | SYSTOLIC BLOOD PRESSURE: 135 MMHG | DIASTOLIC BLOOD PRESSURE: 67 MMHG

## 2021-03-04 DIAGNOSIS — I10 ESSENTIAL HYPERTENSION: ICD-10-CM

## 2021-03-04 DIAGNOSIS — I35.0 AORTIC STENOSIS, SEVERE: Primary | ICD-10-CM

## 2021-03-04 PROCEDURE — 99442 PR PHYS/QHP TELEPHONE EVALUATION 11-20 MIN: CPT | Performed by: NURSE PRACTITIONER

## 2021-03-04 NOTE — TELEPHONE ENCOUNTER
Patient's daughter called and states that patient was seen yesterday by Dr. Sandoval and is having a workup for TAVR. Patient lives in [place] and would like to have everything in [place]. He states that his CT was scheduled here.

## 2021-03-04 NOTE — PROGRESS NOTES
You have chosen to receive care through a telephone visit. Do you consent to use a telephone visit for your medical care today? Yes    Jonathan Fernandez MD  Fidel Roberson  2021    Patient Active Problem List   Diagnosis   • Nonrheumatic aortic valve stenosis       Dear Jonathan Fernandez MD:    Subjective     Chief Complaint   Patient presents with   • Aortic Stenosis           History of Present Illness:    Fidel Roberson is a 61 y.o. male with a past medical history of severe aortic valve stenosis.  He was evaluated by Dr. Sandoval on 3/3/2021.  It was recommended the patient undergo TAVR.  Prior to TAVR, Dr. Sandoval has recommended cardiac catheterization as well as carotid Doppler prior to the procedure.  The patient reports fatigue and shortness of breath.  However he denies any chest pains, dizziness, or syncope.          No Known Allergies:      Current Outpatient Medications:   •  aspirin 81 MG EC tablet, Take 81 mg by mouth Daily., Disp: , Rfl:   •  coenzyme Q10 100 MG capsule, Take 100 mg by mouth Daily., Disp: , Rfl:   •  losartan (COZAAR) 100 MG tablet, Take 100 mg by mouth Daily., Disp: , Rfl: 3  •  Omega-3 1000 MG capsule, Take  by mouth., Disp: , Rfl:   •  simvastatin (ZOCOR) 40 MG tablet, Take 40 mg by mouth Every Night., Disp: , Rfl:       The following portions of the patient's history were reviewed and updated as appropriate: allergies, current medications, past family history, past medical history, past social history, past surgical history and problem list.    Social History     Tobacco Use   • Smoking status: Former Smoker     Packs/day: 1.00     Years: 30.00     Pack years: 30.00     Types: Cigarettes     Quit date: 2010     Years since quittin.0   • Smokeless tobacco: Never Used   Substance Use Topics   • Alcohol use: No   • Drug use: No       Review of Systems   Constitution: Negative for decreased appetite and malaise/fatigue.   Cardiovascular: Positive for dyspnea on  exertion. Negative for chest pain, irregular heartbeat, leg swelling, near-syncope, orthopnea, palpitations, paroxysmal nocturnal dyspnea and syncope.   Respiratory: Negative for cough, shortness of breath and wheezing.    Neurological: Negative for dizziness, light-headedness and weakness.       Objective   Vitals:    03/04/21 1539   BP: 135/67   BP Location: Left arm   Patient Position: Sitting   Cuff Size: Adult   Pulse: 87     There is no height or weight on file to calculate BMI.        Physical Exam       Procedures      Assessment/Plan    Diagnosis Plan   1. Aortic stenosis, severe  US Carotid Bilateral   2. Essential hypertension  US Carotid Bilateral    Basic Metabolic Panel                Recommendations:    1. I have discussed his plan of care with Dr. Guzmán. Will plan left and right heart catheterization for 3/12/2021. I have discussed with the patient about the procedure of cardiac catheterization, potential risks, benefits and alternative methods of management.  Patient expressed understanding of the same and is wanting to proceed.   2. I have ordered a carotid doppler today.  3. I did ask the patient to complete a BMP to evaluate renal function prior to cardiac catheterization.  4. Follow up as scheduled and needed.         This visit has been rescheduled as a phone visit to comply with patient safety concerns in accordance with CDC recommendations. Total time of discussion was 18 minutes.           Return for Next scheduled follow up.    As always, I appreciate very much the opportunity to participate in the cardiovascular care of your patients.      With Best Regards,    SPRING Juan

## 2021-03-05 ENCOUNTER — HOSPITAL ENCOUNTER (OUTPATIENT)
Dept: ULTRASOUND IMAGING | Facility: HOSPITAL | Age: 61
Discharge: HOME | End: 2021-03-05

## 2021-03-05 ENCOUNTER — TELEPHONE (OUTPATIENT)
Dept: CARDIOLOGY | Facility: HOSPITAL | Age: 61
End: 2021-03-05

## 2021-03-05 ENCOUNTER — LAB (OUTPATIENT)
Dept: LAB | Facility: HOSPITAL | Age: 61
End: 2021-03-05

## 2021-03-05 DIAGNOSIS — I10 ESSENTIAL HYPERTENSION: ICD-10-CM

## 2021-03-05 DIAGNOSIS — I35.0 AORTIC STENOSIS, SEVERE: ICD-10-CM

## 2021-03-05 LAB
ALBUMIN SERPL-MCNC: 4.52 G/DL (ref 3.5–5.2)
ALBUMIN/GLOB SERPL: 1.3 G/DL
ALP SERPL-CCNC: 68 U/L (ref 39–117)
ALT SERPL W P-5'-P-CCNC: 28 U/L (ref 1–41)
ANION GAP SERPL CALCULATED.3IONS-SCNC: 11.1 MMOL/L (ref 5–15)
AST SERPL-CCNC: 18 U/L (ref 1–40)
BASOPHILS # BLD AUTO: 0.04 10*3/MM3 (ref 0–0.2)
BASOPHILS NFR BLD AUTO: 0.6 % (ref 0–1.5)
BILIRUB SERPL-MCNC: 0.3 MG/DL (ref 0–1.2)
BUN SERPL-MCNC: 25 MG/DL (ref 8–23)
BUN/CREAT SERPL: 18.7 (ref 7–25)
CALCIUM SPEC-SCNC: 9.6 MG/DL (ref 8.6–10.5)
CHLORIDE SERPL-SCNC: 106 MMOL/L (ref 98–107)
CO2 SERPL-SCNC: 22.9 MMOL/L (ref 22–29)
CREAT SERPL-MCNC: 1.34 MG/DL (ref 0.76–1.27)
DEPRECATED RDW RBC AUTO: 43.3 FL (ref 37–54)
EOSINOPHIL # BLD AUTO: 0.1 10*3/MM3 (ref 0–0.4)
EOSINOPHIL NFR BLD AUTO: 1.4 % (ref 0.3–6.2)
ERYTHROCYTE [DISTWIDTH] IN BLOOD BY AUTOMATED COUNT: 13.6 % (ref 12.3–15.4)
GFR SERPL CREATININE-BSD FRML MDRD: 54 ML/MIN/1.73
GLOBULIN UR ELPH-MCNC: 3.4 GM/DL
GLUCOSE SERPL-MCNC: 80 MG/DL (ref 65–99)
HCT VFR BLD AUTO: 44.3 % (ref 37.5–51)
HGB BLD-MCNC: 13.9 G/DL (ref 13–17.7)
IMM GRANULOCYTES # BLD AUTO: 0.02 10*3/MM3 (ref 0–0.05)
IMM GRANULOCYTES NFR BLD AUTO: 0.3 % (ref 0–0.5)
LYMPHOCYTES # BLD AUTO: 3.25 10*3/MM3 (ref 0.7–3.1)
LYMPHOCYTES NFR BLD AUTO: 45.4 % (ref 19.6–45.3)
MCH RBC QN AUTO: 27.5 PG (ref 26.6–33)
MCHC RBC AUTO-ENTMCNC: 31.4 G/DL (ref 31.5–35.7)
MCV RBC AUTO: 87.5 FL (ref 79–97)
MONOCYTES # BLD AUTO: 0.49 10*3/MM3 (ref 0.1–0.9)
MONOCYTES NFR BLD AUTO: 6.8 % (ref 5–12)
NEUTROPHILS NFR BLD AUTO: 3.26 10*3/MM3 (ref 1.7–7)
NEUTROPHILS NFR BLD AUTO: 45.5 % (ref 42.7–76)
NRBC BLD AUTO-RTO: 0 /100 WBC (ref 0–0.2)
PLATELET # BLD AUTO: 212 10*3/MM3 (ref 140–450)
PMV BLD AUTO: 10.1 FL (ref 6–12)
POTASSIUM SERPL-SCNC: 4.2 MMOL/L (ref 3.5–5.2)
PROT SERPL-MCNC: 7.9 G/DL (ref 6–8.5)
RBC # BLD AUTO: 5.06 10*6/MM3 (ref 4.14–5.8)
SODIUM SERPL-SCNC: 140 MMOL/L (ref 136–145)
WBC # BLD AUTO: 7.16 10*3/MM3 (ref 3.4–10.8)

## 2021-03-05 PROCEDURE — 93880 EXTRACRANIAL BILAT STUDY: CPT | Performed by: RADIOLOGY

## 2021-03-05 PROCEDURE — 93880 EXTRACRANIAL BILAT STUDY: CPT

## 2021-03-05 PROCEDURE — 80053 COMPREHEN METABOLIC PANEL: CPT

## 2021-03-05 PROCEDURE — 36415 COLL VENOUS BLD VENIPUNCTURE: CPT

## 2021-03-05 PROCEDURE — 85025 COMPLETE CBC W/AUTO DIFF WBC: CPT

## 2021-03-08 ENCOUNTER — TRANSCRIBE ORDERS (OUTPATIENT)
Dept: ADMINISTRATIVE | Facility: HOSPITAL | Age: 61
End: 2021-03-08

## 2021-03-08 DIAGNOSIS — Z01.818 PRE-OPERATIVE CLEARANCE: Primary | ICD-10-CM

## 2021-03-09 DIAGNOSIS — I10 ESSENTIAL HYPERTENSION: Primary | ICD-10-CM

## 2021-03-10 ENCOUNTER — LAB (OUTPATIENT)
Dept: LAB | Facility: HOSPITAL | Age: 61
End: 2021-03-10

## 2021-03-10 ENCOUNTER — TELEPHONE (OUTPATIENT)
Dept: CARDIOLOGY | Facility: CLINIC | Age: 61
End: 2021-03-10

## 2021-03-10 DIAGNOSIS — Z01.818 PRE-OPERATIVE CLEARANCE: ICD-10-CM

## 2021-03-10 DIAGNOSIS — I10 ESSENTIAL HYPERTENSION: ICD-10-CM

## 2021-03-10 LAB
ANION GAP SERPL CALCULATED.3IONS-SCNC: 9.7 MMOL/L (ref 5–15)
BUN SERPL-MCNC: 20 MG/DL (ref 8–23)
BUN/CREAT SERPL: 17.7 (ref 7–25)
CALCIUM SPEC-SCNC: 9.7 MG/DL (ref 8.6–10.5)
CHLORIDE SERPL-SCNC: 104 MMOL/L (ref 98–107)
CO2 SERPL-SCNC: 26.3 MMOL/L (ref 22–29)
CREAT SERPL-MCNC: 1.13 MG/DL (ref 0.76–1.27)
GFR SERPL CREATININE-BSD FRML MDRD: 66 ML/MIN/1.73
GLUCOSE SERPL-MCNC: 97 MG/DL (ref 65–99)
POTASSIUM SERPL-SCNC: 4.3 MMOL/L (ref 3.5–5.2)
SARS-COV-2 RNA RESP QL NAA+PROBE: NOT DETECTED
SODIUM SERPL-SCNC: 140 MMOL/L (ref 136–145)

## 2021-03-10 PROCEDURE — C9803 HOPD COVID-19 SPEC COLLECT: HCPCS

## 2021-03-10 PROCEDURE — U0004 COV-19 TEST NON-CDC HGH THRU: HCPCS

## 2021-03-10 PROCEDURE — 36415 COLL VENOUS BLD VENIPUNCTURE: CPT

## 2021-03-10 PROCEDURE — 80048 BASIC METABOLIC PNL TOTAL CA: CPT

## 2021-03-11 ENCOUNTER — TELEPHONE (OUTPATIENT)
Dept: CARDIOLOGY | Facility: CLINIC | Age: 61
End: 2021-03-11

## 2021-03-11 NOTE — TELEPHONE ENCOUNTER
What impact will this have?   Stephane Bright  SURGERY  [address] [place] [address]  Phone: [phone]  Fax: [phone]  Follow Up Time:     Kathy Boyce  ENDOCRINOLOGY/METAB/DIABETES  [address] [address]  Phone: [phone]  Fax: [phone]  Follow Up Time:

## 2021-03-11 NOTE — TELEPHONE ENCOUNTER
Please call patient regarding an appointment with Dr. Sandoval's office and how it will impact not having his heart cath performed tomorrow 3-12-21.

## 2021-03-16 ENCOUNTER — HOSPITAL ENCOUNTER (OUTPATIENT)
Dept: CT IMAGING | Facility: HOSPITAL | Age: 61
Discharge: HOME | End: 2021-03-16
Admitting: NURSE PRACTITIONER

## 2021-03-16 VITALS
DIASTOLIC BLOOD PRESSURE: 84 MMHG | WEIGHT: 211 LBS | HEART RATE: 62 BPM | BODY MASS INDEX: 29.43 KG/M2 | RESPIRATION RATE: 18 BRPM | OXYGEN SATURATION: 97 % | SYSTOLIC BLOOD PRESSURE: 139 MMHG

## 2021-03-16 DIAGNOSIS — I10 ESSENTIAL HYPERTENSION: ICD-10-CM

## 2021-03-16 DIAGNOSIS — I35.0 NONRHEUMATIC AORTIC VALVE STENOSIS: ICD-10-CM

## 2021-03-16 PROCEDURE — 71275 CT ANGIOGRAPHY CHEST: CPT

## 2021-03-16 PROCEDURE — 0 IOPAMIDOL PER 1 ML: Performed by: NURSE PRACTITIONER

## 2021-03-16 PROCEDURE — 74174 CTA ABD&PLVS W/CONTRAST: CPT

## 2021-03-16 PROCEDURE — 82565 ASSAY OF CREATININE: CPT

## 2021-03-16 PROCEDURE — 99214 OFFICE O/P EST MOD 30 MIN: CPT | Performed by: NURSE PRACTITIONER

## 2021-03-16 RX ORDER — NITROGLYCERIN 0.4 MG/1
0.4 TABLET SUBLINGUAL
Status: CANCELLED | OUTPATIENT
Start: 2021-03-16 | End: 2021-03-16

## 2021-03-16 RX ORDER — METOPROLOL TARTRATE 50 MG/1
50 TABLET, FILM COATED ORAL ONCE AS NEEDED
Status: DISCONTINUED | OUTPATIENT
Start: 2021-03-16 | End: 2021-03-17 | Stop reason: HOSPADM

## 2021-03-16 RX ORDER — METOPROLOL TARTRATE 50 MG/1
100 TABLET, FILM COATED ORAL ONCE AS NEEDED
Status: DISCONTINUED | OUTPATIENT
Start: 2021-03-16 | End: 2021-03-17 | Stop reason: HOSPADM

## 2021-03-16 RX ORDER — SODIUM CHLORIDE 0.9 % (FLUSH) 0.9 %
3 SYRINGE (ML) INJECTION EVERY 12 HOURS SCHEDULED
Status: DISCONTINUED | OUTPATIENT
Start: 2021-03-16 | End: 2021-03-17 | Stop reason: HOSPADM

## 2021-03-16 RX ORDER — LIDOCAINE HYDROCHLORIDE 10 MG/ML
5 INJECTION, SOLUTION EPIDURAL; INFILTRATION; INTRACAUDAL; PERINEURAL AS NEEDED
Status: DISCONTINUED | OUTPATIENT
Start: 2021-03-16 | End: 2021-03-17 | Stop reason: HOSPADM

## 2021-03-16 RX ADMIN — IOPAMIDOL 85 ML: 755 INJECTION, SOLUTION INTRAVENOUS at 13:34

## 2021-03-16 NOTE — PROGRESS NOTES
----- Message from Yonathan Roth sent at 12/10/2018 11:29 AM CST -----  Regarding: RE: BOTOX APPROVAL  Yes.  ----- Message -----  From: Maricruz Mast RN  Sent: 12/10/2018   8:52 AM  To: Yonathan Roth  Subject: RE: BOTOX APPROVAL                               Buy and bill?    ----- Message -----  From: Yonathan Roth  Sent: 12/7/2018   2:34 PM  To: [facility] Nurse Msg Pool  Subject: BOTOX APPROVAL                                   Contacted [facility] @ [phone] spoke to Andrew martinez the status of BOTOX INJECTION  Has been approved for 200 units every 12 weeks Auth No. [id number] valid from 12/17/18 thru 03/17/19     Thank you   Yonathan Merchant        TAVR APRN Evaluation    Fidel Roberson, 1960, [id number]     21    PCP: Jonathan Fernandez MD  Primary Cardiologist: Emre Guzmán MD    TAVR Team:  1.  Andrea Rubalcava MD  2.  Fidel Sandoval MD  3.  Amy De La Cruz MD  4.  Navneet Rodriguez MD  5.  Arie Nicholas MD  6.  Aaron Bryan MD    Chief Complaint: Severe aortic stenosis      Identification: This is a 61 y.o. year old male from [address] [place] [address].    History of Present Illness: Mr. Roberson was referred for consideration of AVR per primary Cardiologist, Dr. Guzmán.  PCP, Dr. Fernandez noted a murmur and ordered an echo.  This showed significant aortic valve disease and he was sent to Dr. Guzmán.  Initial TTE measured the J CARLOS as 0.41 cm2, AV mean 63 mm Hg, and Vmax 5.46 m/sec.  A NIKKO was done 21 which showed mean gradietn 42 mm Hg and J CARLOS 0.8 cm 2.  His current cardiac symptoms include YUN, mild dizziness with exertion, and reduced activity tolerance over the past year.  He met with Dr. Fidel Sandoval on 3/3/21 who recommended TAVR.  Today, patient is seen in [facility] prior to [facility] TAVR protocol.  His cardiac cath scheduling has been delayed pending insurance approval.           Problem List:   1.  Aortic valve disease   A.  TTE 21: LVEF 61-65%, J CARLOS 0.41 cm2, AV mean 63 mm Hg   B.  NIKKO 21: LVEF 66-70%, J CARLOS 0.8 cm2, AV mean gradient 42   mm Hg.  Moderate AR  2.  Family history premature CAD   A.  Mother CAD age 60's.  Father  from sudden MI age 60  3.  HTN  4.  Dyslipidemia  5.  Testicular cancer   A.  S/p orchiectomy age 18   B.  Chemotherapy no XRT  6.  Diastolic heart failure    A.  R/t valvular disease   B.  NYHA class II  7.  Bowel obstruction related to abdominal scar tissue requiring surgical   intervention age 39      Patient Active Problem List   Diagnosis   • Nonrheumatic aortic valve stenosis   • Aortic stenosis, severe       Past Surgical History:  Past Surgical History:   Procedure Laterality Date    • COLON SURGERY     • SMALL INTESTINE SURGERY     • TESTICLE SURGERY         Allergies:  Patient has no known allergies.    Social History:  Social History     Socioeconomic History   • Marital status:      Spouse name: Deloris   • Number of children: 1   • Years of education: HS   • Highest education level: Not on file   Tobacco Use   • Smoking status: Former Smoker     Packs/day: 1.00     Years: 30.00     Pack years: 30.00     Types: Cigarettes     Quit date: 2010     Years since quittin.1   • Smokeless tobacco: Never Used   Substance and Sexual Activity   • Alcohol use: No   • Drug use: No   • Sexual activity: Defer       Home Medications   Medication Sig Start Date End Date Taking? Authorizing Provider   aspirin 81 MG EC tablet Take 81 mg by mouth Daily.    Romulo Marshall MD   coenzyme Q10 100 MG capsule Take 100 mg by mouth Daily.    Romulo Marshall MD   losartan (COZAAR) 100 MG tablet Take 100 mg by mouth Daily. 18   Romulo Marshall MD   Omega-3 1000 MG capsule Take  by mouth.    Romulo Marshall MD   simvastatin (ZOCOR) 40 MG tablet Take 40 mg by mouth Every Night.    Romulo Marshall MD       Review of Systems:  Review of Systems   Constitutional: Positive for malaise/fatigue.   HENT: Negative.    Eyes: Negative.    Cardiovascular: Positive for chest pain, dyspnea on exertion and near-syncope. Negative for irregular heartbeat, leg swelling, orthopnea, palpitations, paroxysmal nocturnal dyspnea and syncope.   Respiratory: Negative for cough, sleep disturbances due to breathing, sputum production and wheezing.    Endocrine: Negative.    Hematologic/Lymphatic: Negative.  Does not bruise/bleed easily.   Skin: Negative.    Musculoskeletal: Negative.    Gastrointestinal: Negative.    Genitourinary: Negative.    Neurological: Positive for light-headedness.   Psychiatric/Behavioral: Negative.    Allergic/Immunologic: Negative.         Physical Exam:  Vitals reviewed.    Constitutional:       Appearance: Not in distress.   Eyes:      Conjunctiva/sclera: Conjunctivae normal.      Pupils: Pupils are equal, round, and reactive to light.   Neck:      Thyroid: Thyroid normal. No thyromegaly.      Lymphadenopathy: No cervical adenopathy.   Pulmonary:      Effort: Pulmonary effort is normal.      Breath sounds: Normal breath sounds.   Cardiovascular:      PMI at left midclavicular line. Normal rate. Regular rhythm.      Murmurs: There is a grade 3/6 mid frequency harsh mid to late systolic murmur at the ULSB, radiating to the neck.   Pulses:     Intact distal pulses.   Edema:     Peripheral edema absent.   Musculoskeletal: Normal range of motion.         General: No deformity.      Cervical back: Normal range of motion. Skin:     General: Skin is warm and dry.   Neurological:      Mental Status: Alert and oriented to person, place and time.      Comments: Conversant/ pleasant.  No family present         Vitals:    03/16/21 1059 03/16/21 1127 03/16/21 1405   BP:  125/67 139/84   BP Location:   Right arm   Patient Position:   Sitting   Pulse:  84 62   Resp:  16 18   SpO2:  97% 97%   Weight: 95.7 kg (211 lb)         Diagnostic Data:  Transthoracic echo: 2/4/21  · Normal left ventricular cavity size noted. Left ventricular wall thickness is consistent with mild concentric hypertrophy.  · All left ventricular wall segments contract normally  · Left ventricular ejection fraction appears to be 61 - 65%.  · Left ventricular diastolic function is consistent with (grade II w/high LAP) pseudonormalization.  · The aortic valve is not well visualized. The aortic valve is abnormal in structure. There is severe calcification of the aortic valve. Mild to moderate aortic valve regurgitation is present. Severe aortic valve stenosis is present.  · Stenosis Ao max .8 mmHg Ao mean PG 62.7 mmHg Ao V2  cm J CARLOS(I,D) 0.41 cm^2  · There are myxomatous changes of the mitral valve apparatus present.  There is anterior mitral leaflet thickening present. Mild to moderate mitral valve regurgitation is present. No significant mitral valve stenosis is present.  · There is no evidence of pericardial effusion.  · Comments: Patient seems to have critical aortic stenosis and may have to be considered for aortic valve replacement in the near future.         Transesophageal echo: 2/18/21       · All left ventricular wall segments contract normally.  · Left ventricular wall thickness is consistent with mild to moderate concentric hypertrophy.  · Left ventricular ejection fraction appears to be 66 - 70%.  · The aortic valve is abnormal in structure. There is moderate calcification of the aortic valve mainly affecting the non-coronary, left coronary and right coronary cusp(s). The aortic valve appears trileaflet. Moderate aortic valve regurgitation is present. Moderate to severe aortic valve stenosis is present.  · Ao max PG 82.8 mmHg Ao mean PG 42 mmHg Ao V2  cm J CARLOS(I,D) 0.85 cm^2  · The mitral valve is structurally normal with no significant stenosis present. Mild mitral valve regurgitation is present.  · There is no evidence of pericardial effusion.       Cardiac Cath: pending per Dr. Guzmán scheduling    CTA Chest, Abdomen, and Pelvis:  EXAMINATION: CT ANGIO TAVR CHEST, ABDOMEN AND PELVIS-      COMPARISON: None.     FINDINGS: NONVASCULAR: Thyroid is homogeneous in attenuation. No bulky  mediastinal adenopathy. Central airways are patent. Esophagus mildly  patulous with likely small hiatal hernia in the distal segment. Extended  lung windows reveal accessory fissure right lung apex laterally without  dominant nodule or mass. No consolidation or pleural effusion.  Degenerative changes of the thoracic spine without aggressive osseous  findings of the thoracic spine or chest. Liver demonstrates several  areas of simple fluid attenuation low density foci throughout the  hepatic parenchyma of hepatic cysts likely many of  which are too small  to characterize with background potential mild hepatic steatosis.  Gallbladder unremarkable. No biliary dilatation. Pancreas and spleen are  unremarkable. Adrenals without distinct nodule. Kidneys without  hydronephrosis or hydroureter demonstrating left renal cortical scarring  versus partial nephrectomy findings along the medial aspect without  hydronephrosis or hydroureter. No bulky retroperitoneal adenopathy. GI  tract evaluation without focal thickening or disproportionate dilatation  of bowel to suggest mechanical obstructive process. Appendix visualized  and unremarkable. No free fluid or intraabdominal free air with mild to  moderate colonic stool burden throughout the rectosigmoid colon. Pelvic  viscera unremarkable without bulky pelvic adenopathy or free fluid.  Degenerative changes of the spine and pelvis without aggressive osseous  lesion. No soft tissue body wall findings of acuity, however,  fat-containing right direct inguinal hernia noted.     VASCULAR: Cardiac size borderline enlarged without pericardial effusion.  Atherosclerotic calcific disease of the tricuspid aortic valve with  supravalvular ascending thoracic aorta demonstrating maximum transaxial  diameter 3.7 cm without evidence for dissection or periaortic  inflammation with patency of the normal three vessel arch branching  pattern with minimal calcific disease of the left subclavian takeoff and  descending nonaneurysmal thoracic aorta. Atherosclerotic nonaneurysmal  patent abdominal aorta with patent celiac and SMA origins. Renal  arteries patent bilaterally along with PATRICK patent. Atherosclerotic  calcific disease of the proximal common iliac arteries ascending to the  external iliac and common femoral vessels right greater than left  without focal severe stenosis, aneurysm or occlusion in the iliac or  common femoral regions.     IMPRESSION:  1. Significant atherosclerotic calcific disease of the tricuspid  aortic  valve with supravalvular ascending thoracic aorta measuring 3.7 cm  maximum transaxial diameter at the level of the main pulmonary artery.  2. Atherosclerotic nonaneurysmal abdominal aorta and iliac systems with  right external iliac and common femoral minimal calcific disease greater  than left without focal severe stenosis or occlusion.  3. Nonvascular findings without acute pathology, however, noted multiple  hepatic cysts along with a fat-containing right direct inguinal hernia.     D:  03/17/2021       Carotid Doppler: US CAROTID BILATERAL   FINDINGS:        RIGHT:  No significant intimal thickening or plaque is noted. No occlusion.     RIGHT ICA PSV: 103 cm/s  RIGHT ICA EDV: 16.3 cm/s.   Right ICA/CCA Ratio: 0.9  Anterograde flow is demonstrated in RIGHT vertebral artery.     LEFT:  No focal stenosis     LEFT ICA PSV: 60.2 cm/s  LEFT EDV: 13.4 cm/s.   Left ICA/CCA Ratio: 0.6  Anterograde flow is demonstrated in LEFT vertebral artery.        IMPRESSION:  Impression:     No evidence of hemodynamically significant plaques or stenosis within  the carotid system at this time.     This report was finalized on 3/5/2021 4:33 PM by Dr. José Manuel Lane MD.        Functional Assessment Data:    KCCQ12 Questionnaire Score: (see scanned copy) :  44/70      [place] Basic Activities of Daily Living (ADL) Scale    Bathing (sponge bath, tub bath, or shower).  Receives either no assistance,   or assistance with bathing only on body part.   Yes    Dressing Gets clothes and dresses without any assistance, except for  tying shoes.        Yes    Toileting Goes to toilet room, uses toilet, arranges clothes, and returns  without any assistance (may use cane or walker for support and may use  bedpan / urinal at night.      Yes    Transferring Moves into and out of bed and chair without assistance  (may use cane or walker)      Yes    Continence Controls bowel and bladder completely without  occasional  accidents        Yes    Feeding Feeds self without assistance (except for help with cutting meat  or buttering bread)       Yes        Total (Number of Yesses of 6) 6/6      [place] Instrumental Activities of Daily Living Scale (IADL)    Ability to Use Telephone   · Operates telephone on own initiative.  Looks up and dials numbers, etc.         1  Shopping  Takes care of all shopping needs indepently  1     Food Preparation  · Plans, prepares, and serves adequate meals independently  ·         1    Housekeeping  · Maintains house alone or with occasional assistance,   e.g. heavy work domestic help    1     Laundry  Does personal laundry completely   1     Mode of Transportation  · Travels independently on public transportation or drives own car          1  Responsibility for Own Medications  · Is responsible for taking medications in correct dosages at correct time          1  Ability to Handle Finances  · Manages financial matters independently (budgets, writes checks,   pays rent, bills, goes to bank), collects and keeps track of income          1     Total (Number of Instrumental Activities of 8) 8/8         Five Meter Walk Test    Utilized Walking Aid? No     Walk 1: 3.7 s/5m     Walk 2: 5.95 s/5m     Walk 3: 3.84 s/5m    Five Meter Walk Average: 4.49 s/5m    Gait Speed: Normal (Average < or = 6 s/5m)     STS risk of mortality with open AVR:  0.62%  http://riskcalc.sts.org/stswebriskcalc/calculate       Creatinine Clearance: 89 ml/min  https://reference.[employer]/calculator/creatinine-clearance-cockcroft-gault    Assessment/ Plan:       1. Nonrheumatic aortic valve stenosis.  Low risk gentleman who wishes to pursue TAVR instead of SAVR.    2. Essential hypertension    3. Dyslipidemia   4.  Diastolic heart failure r/t valvular disease.  NYHA class II     TAVR education materials reviewed with patient today.  This included printed brochure detailing pathophysiology of aortic stenosis, patient  specific aortic stenosis symptoms, and treatment options (medical therapy, surgical aortic valve replacement, and transcatheter aortic valve replacement).  A model of the valve and procedural animation were also used to explain TAVR.  We discussed patient's goals of care:  Continue living and regain activity tolerance.  We reviewed the [facility] Shared Decision Making Tool for treatment of Aortic Stenosis to again compare/contrast the options of TAVR/ SAVR/ medical management.  He wishes to pursue TAVR instead of open AVR. He states he is comfortable with this choice after discussion with Dr. Sandoval.    Our talk also included procedural details, procedure risks, anticipated pre-op and post-op expectations, as well as follow up visit schedule @ one month and one year.  Further discussion and final decision making will occur with Multi- Disciplinary Heart Team following the completion of pre-requisite testing.  Potential TAVR procedure date 4/12/21 to follow cardiac cath 3/26/21.    Shirley Kenyon, [place], 03/19/21, 11:05 EDT

## 2021-03-17 LAB — CREAT BLDA-MCNC: 1 MG/DL (ref 0.6–1.3)

## 2021-03-22 ENCOUNTER — IMMUNIZATION (OUTPATIENT)
Dept: VACCINE CLINIC | Facility: HOSPITAL | Age: 61
End: 2021-03-22

## 2021-03-22 ENCOUNTER — TRANSCRIBE ORDERS (OUTPATIENT)
Dept: ADMINISTRATIVE | Facility: HOSPITAL | Age: 61
End: 2021-03-22

## 2021-03-22 DIAGNOSIS — Z01.818 PRE-OPERATIVE CLEARANCE: Primary | ICD-10-CM

## 2021-03-22 PROCEDURE — 0002A: CPT | Performed by: INTERNAL MEDICINE

## 2021-03-22 PROCEDURE — 91300 HC SARSCOV02 VAC 30MCG/0.3ML IM: CPT | Performed by: INTERNAL MEDICINE

## 2021-03-24 ENCOUNTER — LAB (OUTPATIENT)
Dept: LAB | Facility: HOSPITAL | Age: 61
End: 2021-03-24

## 2021-03-24 DIAGNOSIS — Z01.818 PRE-OPERATIVE CLEARANCE: ICD-10-CM

## 2021-03-24 PROCEDURE — U0004 COV-19 TEST NON-CDC HGH THRU: HCPCS

## 2021-03-24 PROCEDURE — C9803 HOPD COVID-19 SPEC COLLECT: HCPCS

## 2021-03-25 LAB — SARS-COV-2 RNA NOSE QL NAA+PROBE: NOT DETECTED

## 2021-03-26 ENCOUNTER — HOSPITAL ENCOUNTER (OUTPATIENT)
Facility: HOSPITAL | Age: 61
Setting detail: HOSPITAL OUTPATIENT SURGERY
Discharge: HOME | End: 2021-03-26
Attending: INTERNAL MEDICINE | Admitting: NURSE PRACTITIONER

## 2021-03-26 VITALS
DIASTOLIC BLOOD PRESSURE: 83 MMHG | SYSTOLIC BLOOD PRESSURE: 139 MMHG | BODY MASS INDEX: 28.17 KG/M2 | HEART RATE: 86 BPM | HEIGHT: 72 IN | WEIGHT: 208 LBS | OXYGEN SATURATION: 98 % | RESPIRATION RATE: 18 BRPM

## 2021-03-26 DIAGNOSIS — I35.0 AORTIC STENOSIS, SEVERE: ICD-10-CM

## 2021-03-26 LAB
A-A DO2: 25.1 MMHG (ref 0–300)
ANION GAP SERPL CALCULATED.3IONS-SCNC: 8.7 MMOL/L (ref 5–15)
ARTERIAL PATENCY WRIST A: ABNORMAL
ATMOSPHERIC PRESS: 728 MMHG
ATMOSPHERIC PRESS: 728 MMHG
BASE EXCESS BLDA CALC-SCNC: 0.7 MMOL/L (ref 0–2)
BASE EXCESS BLDV CALC-SCNC: 0.5 MMOL/L (ref 0–2)
BASOPHILS # BLD AUTO: 0.04 10*3/MM3 (ref 0–0.2)
BASOPHILS NFR BLD AUTO: 0.5 % (ref 0–1.5)
BDY SITE: ABNORMAL
BDY SITE: NORMAL
BODY TEMPERATURE: 0 C
BODY TEMPERATURE: 37 C
BUN SERPL-MCNC: 20 MG/DL (ref 8–23)
BUN/CREAT SERPL: 19.4 (ref 7–25)
CALCIUM SPEC-SCNC: 10.2 MG/DL (ref 8.6–10.5)
CHLORIDE SERPL-SCNC: 103 MMOL/L (ref 98–107)
CO2 BLDA-SCNC: 26.8 MMOL/L (ref 22–33)
CO2 BLDA-SCNC: 28 MMOL/L (ref 22–33)
CO2 SERPL-SCNC: 27.3 MMOL/L (ref 22–29)
COHGB MFR BLD: 0.7 % (ref 0–5)
COHGB MFR BLD: 1 % (ref 0–5)
CREAT SERPL-MCNC: 1.03 MG/DL (ref 0.76–1.27)
DEPRECATED RDW RBC AUTO: 46.3 FL (ref 37–54)
EOSINOPHIL # BLD AUTO: 0.05 10*3/MM3 (ref 0–0.4)
EOSINOPHIL NFR BLD AUTO: 0.6 % (ref 0.3–6.2)
ERYTHROCYTE [DISTWIDTH] IN BLOOD BY AUTOMATED COUNT: 14.3 % (ref 12.3–15.4)
GAS FLOW AIRWAY: 2 LPM
GAS FLOW AIRWAY: 2 LPM
GFR SERPL CREATININE-BSD FRML MDRD: 73 ML/MIN/1.73
GLUCOSE SERPL-MCNC: 104 MG/DL (ref 65–99)
HCO3 BLDA-SCNC: 25.5 MMOL/L (ref 20–26)
HCO3 BLDV-SCNC: 26.6 MMOL/L (ref 22–28)
HCT VFR BLD AUTO: 48.1 % (ref 37.5–51)
HCT VFR BLD CALC: 44.2 % (ref 38–51)
HGB BLD-MCNC: 15.2 G/DL (ref 13–17.7)
HGB BLDA-MCNC: 14.4 G/DL (ref 14–18)
HGB BLDA-MCNC: 15.6 G/DL (ref 14–18)
IMM GRANULOCYTES # BLD AUTO: 0.04 10*3/MM3 (ref 0–0.05)
IMM GRANULOCYTES NFR BLD AUTO: 0.5 % (ref 0–0.5)
INHALED O2 CONCENTRATION: 28 %
INHALED O2 CONCENTRATION: 28 %
LYMPHOCYTES # BLD AUTO: 1.96 10*3/MM3 (ref 0.7–3.1)
LYMPHOCYTES NFR BLD AUTO: 24.7 % (ref 19.6–45.3)
Lab: ABNORMAL
Lab: NORMAL
MCH RBC QN AUTO: 28.1 PG (ref 26.6–33)
MCHC RBC AUTO-ENTMCNC: 31.6 G/DL (ref 31.5–35.7)
MCV RBC AUTO: 88.9 FL (ref 79–97)
METHGB BLD QL: 0.3 % (ref 0–3)
METHGB BLD QL: 0.4 % (ref 0–3)
MODALITY: ABNORMAL
MODALITY: NORMAL
MONOCYTES # BLD AUTO: 0.56 10*3/MM3 (ref 0.1–0.9)
MONOCYTES NFR BLD AUTO: 7.1 % (ref 5–12)
NEUTROPHILS NFR BLD AUTO: 5.27 10*3/MM3 (ref 1.7–7)
NEUTROPHILS NFR BLD AUTO: 66.6 % (ref 42.7–76)
NOTE: ABNORMAL
NRBC BLD AUTO-RTO: 0 /100 WBC (ref 0–0.2)
OXYHGB MFR BLDV: 72.7 % (ref 45–75)
OXYHGB MFR BLDV: 97.6 % (ref 94–99)
PCO2 BLDA: 40.7 MM HG (ref 35–45)
PCO2 BLDV: 46.8 MM HG (ref 41–51)
PCO2 TEMP ADJ BLD: ABNORMAL MM[HG]
PH BLDA: 7.41 PH UNITS (ref 7.35–7.45)
PH BLDV: 7.36 PH UNITS (ref 7.32–7.42)
PH, TEMP CORRECTED: ABNORMAL
PLATELET # BLD AUTO: 211 10*3/MM3 (ref 140–450)
PMV BLD AUTO: 9.7 FL (ref 6–12)
PO2 BLDA: 120 MM HG (ref 83–108)
PO2 BLDV: 41.4 MM HG (ref 27–53)
PO2 TEMP ADJ BLD: ABNORMAL MM[HG]
POTASSIUM SERPL-SCNC: 4.8 MMOL/L (ref 3.5–5.2)
RBC # BLD AUTO: 5.41 10*6/MM3 (ref 4.14–5.8)
SAO2 % BLDCOA: 98.7 % (ref 94–99)
SAO2 % BLDCOV: 73.7 % (ref 45–75)
SODIUM SERPL-SCNC: 139 MMOL/L (ref 136–145)
VENTILATOR MODE: ABNORMAL
VENTILATOR MODE: NORMAL
WBC # BLD AUTO: 7.92 10*3/MM3 (ref 3.4–10.8)

## 2021-03-26 PROCEDURE — C1769 GUIDE WIRE: HCPCS | Performed by: INTERNAL MEDICINE

## 2021-03-26 PROCEDURE — 82820 HEMOGLOBIN-OXYGEN AFFINITY: CPT

## 2021-03-26 PROCEDURE — 82805 BLOOD GASES W/O2 SATURATION: CPT

## 2021-03-26 PROCEDURE — C1894 INTRO/SHEATH, NON-LASER: HCPCS | Performed by: INTERNAL MEDICINE

## 2021-03-26 PROCEDURE — 25010000002 MIDAZOLAM PER 1 MG: Performed by: INTERNAL MEDICINE

## 2021-03-26 PROCEDURE — 80048 BASIC METABOLIC PNL TOTAL CA: CPT | Performed by: INTERNAL MEDICINE

## 2021-03-26 PROCEDURE — 25010000002 HEPARIN (PORCINE) PER 1000 UNITS: Performed by: INTERNAL MEDICINE

## 2021-03-26 PROCEDURE — 96372 THER/PROPH/DIAG INJ SC/IM: CPT | Performed by: INTERNAL MEDICINE

## 2021-03-26 PROCEDURE — 82375 ASSAY CARBOXYHB QUANT: CPT

## 2021-03-26 PROCEDURE — 25010000002 DIPHENHYDRAMINE PER 50 MG: Performed by: INTERNAL MEDICINE

## 2021-03-26 PROCEDURE — 36600 WITHDRAWAL OF ARTERIAL BLOOD: CPT

## 2021-03-26 PROCEDURE — 85025 COMPLETE CBC W/AUTO DIFF WBC: CPT | Performed by: INTERNAL MEDICINE

## 2021-03-26 PROCEDURE — 93460 R&L HRT ART/VENTRICLE ANGIO: CPT | Performed by: INTERNAL MEDICINE

## 2021-03-26 PROCEDURE — C1760 CLOSURE DEV, VASC: HCPCS | Performed by: INTERNAL MEDICINE

## 2021-03-26 PROCEDURE — 83050 HGB METHEMOGLOBIN QUAN: CPT

## 2021-03-26 PROCEDURE — 0 IOPAMIDOL PER 1 ML: Performed by: INTERNAL MEDICINE

## 2021-03-26 RX ORDER — LIDOCAINE HYDROCHLORIDE 20 MG/ML
INJECTION, SOLUTION INFILTRATION; PERINEURAL AS NEEDED
Status: DISCONTINUED | OUTPATIENT
Start: 2021-03-26 | End: 2021-03-26 | Stop reason: HOSPADM

## 2021-03-26 RX ORDER — HEPARIN SODIUM 1000 [USP'U]/ML
INJECTION, SOLUTION INTRAVENOUS; SUBCUTANEOUS AS NEEDED
Status: DISCONTINUED | OUTPATIENT
Start: 2021-03-26 | End: 2021-03-26 | Stop reason: HOSPADM

## 2021-03-26 RX ORDER — LISINOPRIL 2.5 MG/1
5 TABLET ORAL DAILY
Status: CANCELLED | OUTPATIENT
Start: 2021-03-26

## 2021-03-26 RX ORDER — MIDAZOLAM HYDROCHLORIDE 1 MG/ML
INJECTION INTRAMUSCULAR; INTRAVENOUS AS NEEDED
Status: DISCONTINUED | OUTPATIENT
Start: 2021-03-26 | End: 2021-03-26 | Stop reason: HOSPADM

## 2021-03-26 RX ORDER — DIPHENHYDRAMINE HYDROCHLORIDE 50 MG/ML
INJECTION INTRAMUSCULAR; INTRAVENOUS AS NEEDED
Status: DISCONTINUED | OUTPATIENT
Start: 2021-03-26 | End: 2021-03-26 | Stop reason: HOSPADM

## 2021-03-26 RX ORDER — SODIUM CHLORIDE 9 MG/ML
INJECTION, SOLUTION INTRAVENOUS CONTINUOUS PRN
Status: COMPLETED | OUTPATIENT
Start: 2021-03-26 | End: 2021-03-26

## 2021-03-31 ENCOUNTER — OFFICE VISIT (OUTPATIENT)
Dept: CARDIOLOGY | Facility: CLINIC | Age: 61
End: 2021-03-31

## 2021-03-31 VITALS
HEART RATE: 81 BPM | WEIGHT: 215 LBS | BODY MASS INDEX: 29.12 KG/M2 | TEMPERATURE: 97.5 F | SYSTOLIC BLOOD PRESSURE: 140 MMHG | HEIGHT: 72 IN | OXYGEN SATURATION: 98 % | DIASTOLIC BLOOD PRESSURE: 78 MMHG

## 2021-03-31 DIAGNOSIS — I35.0 AORTIC STENOSIS, SEVERE: Primary | ICD-10-CM

## 2021-03-31 DIAGNOSIS — I25.10 ASCVD (ARTERIOSCLEROTIC CARDIOVASCULAR DISEASE): ICD-10-CM

## 2021-03-31 DIAGNOSIS — I10 ESSENTIAL HYPERTENSION: ICD-10-CM

## 2021-03-31 PROCEDURE — 99213 OFFICE O/P EST LOW 20 MIN: CPT | Performed by: INTERNAL MEDICINE

## 2021-04-06 ENCOUNTER — PREP FOR SURGERY (OUTPATIENT)
Dept: OTHER | Facility: HOSPITAL | Age: 61
End: 2021-04-06

## 2021-04-06 DIAGNOSIS — I35.9 AVD (AORTIC VALVE DISEASE): Primary | ICD-10-CM

## 2021-04-06 RX ORDER — ASPIRIN 325 MG
325 TABLET ORAL NIGHTLY
Status: CANCELLED | OUTPATIENT
Start: 2021-04-09 | End: 2021-04-10

## 2021-04-06 RX ORDER — NITROGLYCERIN 0.4 MG/1
0.4 TABLET SUBLINGUAL
Status: CANCELLED | OUTPATIENT
Start: 2021-04-12

## 2021-04-06 RX ORDER — CHLORHEXIDINE GLUCONATE 500 MG/1
1 CLOTH TOPICAL EVERY 12 HOURS PRN
Status: CANCELLED | OUTPATIENT
Start: 2021-04-09

## 2021-04-06 RX ORDER — ACETAMINOPHEN 325 MG/1
650 TABLET ORAL EVERY 4 HOURS PRN
Status: CANCELLED | OUTPATIENT
Start: 2021-04-12

## 2021-04-06 RX ORDER — CHLORHEXIDINE GLUCONATE 500 MG/1
1 CLOTH TOPICAL EVERY 12 HOURS PRN
Status: CANCELLED | OUTPATIENT
Start: 2021-04-12

## 2021-04-06 RX ORDER — CHLORHEXIDINE GLUCONATE 0.12 MG/ML
15 RINSE ORAL ONCE
Status: CANCELLED | OUTPATIENT
Start: 2021-04-12 | End: 2021-04-12

## 2021-04-09 ENCOUNTER — HOSPITAL ENCOUNTER (OUTPATIENT)
Dept: PULMONOLOGY | Facility: HOSPITAL | Age: 61
Discharge: HOME | End: 2021-04-09

## 2021-04-09 ENCOUNTER — PRE-ADMISSION TESTING (OUTPATIENT)
Dept: PREADMISSION TESTING | Facility: HOSPITAL | Age: 61
End: 2021-04-09

## 2021-04-09 VITALS — HEIGHT: 72 IN | WEIGHT: 212.8 LBS | BODY MASS INDEX: 28.82 KG/M2 | OXYGEN SATURATION: 99 %

## 2021-04-09 DIAGNOSIS — I35.9 AVD (AORTIC VALVE DISEASE): ICD-10-CM

## 2021-04-09 LAB
ABO GROUP BLD: NORMAL
ALBUMIN SERPL-MCNC: 4.5 G/DL (ref 3.5–5.2)
ALBUMIN/GLOB SERPL: 1.6 G/DL
ALP SERPL-CCNC: 79 U/L (ref 39–117)
ALT SERPL W P-5'-P-CCNC: 21 U/L (ref 1–41)
AMPHET+METHAMPHET UR QL: NEGATIVE
AMPHETAMINES UR QL: NEGATIVE
ANION GAP SERPL CALCULATED.3IONS-SCNC: 8 MMOL/L (ref 5–15)
APTT PPP: 27.9 SECONDS (ref 22–39)
AST SERPL-CCNC: 19 U/L (ref 1–40)
BARBITURATES UR QL SCN: NEGATIVE
BASOPHILS # BLD AUTO: 0.03 10*3/MM3 (ref 0–0.2)
BASOPHILS NFR BLD AUTO: 0.4 % (ref 0–1.5)
BENZODIAZ UR QL SCN: NEGATIVE
BILIRUB SERPL-MCNC: 0.4 MG/DL (ref 0–1.2)
BLD GP AB SCN SERPL QL: NEGATIVE
BUN SERPL-MCNC: 17 MG/DL (ref 8–23)
BUN/CREAT SERPL: 15.7 (ref 7–25)
BUPRENORPHINE SERPL-MCNC: NEGATIVE NG/ML
CALCIUM SPEC-SCNC: 9.7 MG/DL (ref 8.6–10.5)
CANNABINOIDS SERPL QL: NEGATIVE
CHLORIDE SERPL-SCNC: 104 MMOL/L (ref 98–107)
CO2 SERPL-SCNC: 27 MMOL/L (ref 22–29)
COCAINE UR QL: NEGATIVE
CREAT SERPL-MCNC: 1.08 MG/DL (ref 0.76–1.27)
DEPRECATED RDW RBC AUTO: 44.8 FL (ref 37–54)
EOSINOPHIL # BLD AUTO: 0.03 10*3/MM3 (ref 0–0.4)
EOSINOPHIL NFR BLD AUTO: 0.4 % (ref 0.3–6.2)
ERYTHROCYTE [DISTWIDTH] IN BLOOD BY AUTOMATED COUNT: 13.9 % (ref 12.3–15.4)
FLUAV RNA RESP QL NAA+PROBE: NOT DETECTED
FLUBV RNA RESP QL NAA+PROBE: NOT DETECTED
GFR SERPL CREATININE-BSD FRML MDRD: 69 ML/MIN/1.73
GLOBULIN UR ELPH-MCNC: 2.9 GM/DL
GLUCOSE SERPL-MCNC: 167 MG/DL (ref 65–99)
HBA1C MFR BLD: 6.4 % (ref 4.8–5.6)
HCT VFR BLD AUTO: 45.9 % (ref 37.5–51)
HGB BLD-MCNC: 14.4 G/DL (ref 13–17.7)
IMM GRANULOCYTES # BLD AUTO: 0.01 10*3/MM3 (ref 0–0.05)
IMM GRANULOCYTES NFR BLD AUTO: 0.1 % (ref 0–0.5)
INR PPP: 1.01 (ref 0.85–1.16)
LYMPHOCYTES # BLD AUTO: 1.97 10*3/MM3 (ref 0.7–3.1)
LYMPHOCYTES NFR BLD AUTO: 29.5 % (ref 19.6–45.3)
MAGNESIUM SERPL-MCNC: 1.9 MG/DL (ref 1.6–2.4)
MCH RBC QN AUTO: 27.9 PG (ref 26.6–33)
MCHC RBC AUTO-ENTMCNC: 31.4 G/DL (ref 31.5–35.7)
MCV RBC AUTO: 88.8 FL (ref 79–97)
METHADONE UR QL SCN: NEGATIVE
MONOCYTES # BLD AUTO: 0.37 10*3/MM3 (ref 0.1–0.9)
MONOCYTES NFR BLD AUTO: 5.5 % (ref 5–12)
NEUTROPHILS NFR BLD AUTO: 4.27 10*3/MM3 (ref 1.7–7)
NEUTROPHILS NFR BLD AUTO: 64.1 % (ref 42.7–76)
NRBC BLD AUTO-RTO: 0 /100 WBC (ref 0–0.2)
OPIATES UR QL: NEGATIVE
OXYCODONE UR QL SCN: NEGATIVE
PA ADP PRP-ACNC: 212 PRU
PCP UR QL SCN: NEGATIVE
PLATELET # BLD AUTO: 232 10*3/MM3 (ref 140–450)
PMV BLD AUTO: 9.5 FL (ref 6–12)
POTASSIUM SERPL-SCNC: 4.1 MMOL/L (ref 3.5–5.2)
PROPOXYPH UR QL: NEGATIVE
PROT SERPL-MCNC: 7.4 G/DL (ref 6–8.5)
PROTHROMBIN TIME: 13 SECONDS (ref 11.4–14.4)
RBC # BLD AUTO: 5.17 10*6/MM3 (ref 4.14–5.8)
RH BLD: POSITIVE
SARS-COV-2 RNA RESP QL NAA+PROBE: NOT DETECTED
SODIUM SERPL-SCNC: 139 MMOL/L (ref 136–145)
T&S EXPIRATION DATE: NORMAL
TRICYCLICS UR QL SCN: NEGATIVE
WBC # BLD AUTO: 6.68 10*3/MM3 (ref 3.4–10.8)

## 2021-04-09 PROCEDURE — 80306 DRUG TEST PRSMV INSTRMNT: CPT

## 2021-04-09 PROCEDURE — 86850 RBC ANTIBODY SCREEN: CPT

## 2021-04-09 PROCEDURE — 86920 COMPATIBILITY TEST SPIN: CPT

## 2021-04-09 PROCEDURE — 86900 BLOOD TYPING SEROLOGIC ABO: CPT

## 2021-04-09 PROCEDURE — 85576 BLOOD PLATELET AGGREGATION: CPT

## 2021-04-09 PROCEDURE — C9803 HOPD COVID-19 SPEC COLLECT: HCPCS

## 2021-04-09 PROCEDURE — 85025 COMPLETE CBC W/AUTO DIFF WBC: CPT

## 2021-04-09 PROCEDURE — 85610 PROTHROMBIN TIME: CPT

## 2021-04-09 PROCEDURE — 85730 THROMBOPLASTIN TIME PARTIAL: CPT

## 2021-04-09 PROCEDURE — 83036 HEMOGLOBIN GLYCOSYLATED A1C: CPT

## 2021-04-09 PROCEDURE — 94010 BREATHING CAPACITY TEST: CPT | Performed by: INTERNAL MEDICINE

## 2021-04-09 PROCEDURE — 36415 COLL VENOUS BLD VENIPUNCTURE: CPT

## 2021-04-09 PROCEDURE — 80053 COMPREHEN METABOLIC PANEL: CPT

## 2021-04-09 PROCEDURE — 86901 BLOOD TYPING SEROLOGIC RH(D): CPT

## 2021-04-09 PROCEDURE — 83735 ASSAY OF MAGNESIUM: CPT

## 2021-04-09 PROCEDURE — 87636 SARSCOV2 & INF A&B AMP PRB: CPT

## 2021-04-09 PROCEDURE — 94010 BREATHING CAPACITY TEST: CPT

## 2021-04-09 RX ORDER — CHLORHEXIDINE GLUCONATE 500 MG/1
1 CLOTH TOPICAL EVERY 12 HOURS PRN
Status: DISCONTINUED | OUTPATIENT
Start: 2021-04-09 | End: 2021-04-19

## 2021-04-09 RX ORDER — CHOLECALCIFEROL (VITAMIN D3) 125 MCG
5 CAPSULE ORAL NIGHTLY
COMMUNITY

## 2021-04-09 RX ORDER — ASPIRIN 325 MG
325 TABLET ORAL NIGHTLY
Status: SHIPPED | OUTPATIENT
Start: 2021-04-09 | End: 2021-04-10

## 2021-04-09 NOTE — PAT
Patient to apply Chlorhexadine wipes  to surgical area (as instructed) the night before procedure and the AM of procedure. Wipes provided.    Instructed patient to take 325 mg the night before heart surgery as per CT surgeon's order.  Patient and/or family verbalized understanding.    Bactroban was given to patient to use the night before surgery.    O2 sat 99%.

## 2021-04-11 ENCOUNTER — ANESTHESIA EVENT (OUTPATIENT)
Dept: PERIOP | Facility: HOSPITAL | Age: 61
End: 2021-04-11

## 2021-04-11 RX ORDER — FAMOTIDINE 10 MG/ML
20 INJECTION, SOLUTION INTRAVENOUS ONCE
Status: CANCELLED | OUTPATIENT
Start: 2021-04-11 | End: 2021-04-11

## 2021-04-12 ENCOUNTER — ANESTHESIA (OUTPATIENT)
Dept: PERIOP | Facility: HOSPITAL | Age: 61
End: 2021-04-12

## 2021-04-12 ENCOUNTER — HOSPITAL ENCOUNTER (INPATIENT)
Facility: HOSPITAL | Age: 61
LOS: 1 days | Discharge: HOME | End: 2021-04-13
Attending: THORACIC SURGERY (CARDIOTHORACIC VASCULAR SURGERY) | Admitting: THORACIC SURGERY (CARDIOTHORACIC VASCULAR SURGERY)

## 2021-04-12 ENCOUNTER — ANCILLARY PROCEDURE (OUTPATIENT)
Dept: PERIOP | Facility: HOSPITAL | Age: 61
End: 2021-04-12

## 2021-04-12 DIAGNOSIS — I35.0 AORTIC STENOSIS, SEVERE: Primary | ICD-10-CM

## 2021-04-12 DIAGNOSIS — I35.0 NONRHEUMATIC AORTIC VALVE STENOSIS: ICD-10-CM

## 2021-04-12 DIAGNOSIS — I35.9 AVD (AORTIC VALVE DISEASE): ICD-10-CM

## 2021-04-12 DIAGNOSIS — I35.0 AORTIC STENOSIS, SEVERE: ICD-10-CM

## 2021-04-12 LAB
ACT BLD: 109 SECONDS (ref 82–152)
ACT BLD: 125 SECONDS (ref 82–152)
ACT BLD: 290 SECONDS (ref 82–152)
ANION GAP SERPL CALCULATED.3IONS-SCNC: 9 MMOL/L (ref 5–15)
APTT PPP: 25.3 SECONDS (ref 22–39)
BASE EXCESS BLDA CALC-SCNC: 4 MMOL/L (ref -5–5)
BUN SERPL-MCNC: 16 MG/DL (ref 8–23)
BUN/CREAT SERPL: 15.7 (ref 7–25)
CA-I BLDA-SCNC: 1.18 MMOL/L (ref 1.2–1.32)
CALCIUM SPEC-SCNC: 8.3 MG/DL (ref 8.6–10.5)
CHLORIDE SERPL-SCNC: 105 MMOL/L (ref 98–107)
CO2 BLDA-SCNC: 30 MMOL/L (ref 24–29)
CO2 SERPL-SCNC: 25 MMOL/L (ref 22–29)
CREAT SERPL-MCNC: 1.02 MG/DL (ref 0.76–1.27)
DEPRECATED RDW RBC AUTO: 45.5 FL (ref 37–54)
ERYTHROCYTE [DISTWIDTH] IN BLOOD BY AUTOMATED COUNT: 14.2 % (ref 12.3–15.4)
GFR SERPL CREATININE-BSD FRML MDRD: 74 ML/MIN/1.73
GLUCOSE BLDC GLUCOMTR-MCNC: 106 MG/DL (ref 70–130)
GLUCOSE BLDC GLUCOMTR-MCNC: 89 MG/DL (ref 70–130)
GLUCOSE BLDC GLUCOMTR-MCNC: 99 MG/DL (ref 70–130)
GLUCOSE SERPL-MCNC: 124 MG/DL (ref 65–99)
HCO3 BLDA-SCNC: 29 MMOL/L (ref 22–26)
HCT VFR BLD AUTO: 41.5 % (ref 37.5–51)
HCT VFR BLDA CALC: 41 % (ref 38–51)
HGB BLD-MCNC: 13.3 G/DL (ref 13–17.7)
HGB BLDA-MCNC: 13.9 G/DL (ref 12–17)
MCH RBC QN AUTO: 28.4 PG (ref 26.6–33)
MCHC RBC AUTO-ENTMCNC: 32 G/DL (ref 31.5–35.7)
MCV RBC AUTO: 88.5 FL (ref 79–97)
PCO2 BLDA: 46.6 MM HG (ref 35–45)
PH BLDA: 7.4 PH UNITS (ref 7.35–7.6)
PLATELET # BLD AUTO: 172 10*3/MM3 (ref 140–450)
PMV BLD AUTO: 9.8 FL (ref 6–12)
PO2 BLDA: 370 MMHG (ref 80–105)
POTASSIUM BLDA-SCNC: 3.7 MMOL/L (ref 3.5–4.9)
POTASSIUM SERPL-SCNC: 4.1 MMOL/L (ref 3.5–5.2)
QT INTERVAL: 460 MS
QTC INTERVAL: 503 MS
RBC # BLD AUTO: 4.69 10*6/MM3 (ref 4.14–5.8)
SAO2 % BLDA: 100 % (ref 95–98)
SODIUM BLD-SCNC: 140 MMOL/L (ref 138–146)
SODIUM SERPL-SCNC: 139 MMOL/L (ref 136–145)
WBC # BLD AUTO: 13.97 10*3/MM3 (ref 3.4–10.8)

## 2021-04-12 PROCEDURE — C1889 IMPLANT/INSERT DEVICE, NOC: HCPCS | Performed by: THORACIC SURGERY (CARDIOTHORACIC VASCULAR SURGERY)

## 2021-04-12 PROCEDURE — C1887 CATHETER, GUIDING: HCPCS | Performed by: THORACIC SURGERY (CARDIOTHORACIC VASCULAR SURGERY)

## 2021-04-12 PROCEDURE — 84295 ASSAY OF SERUM SODIUM: CPT

## 2021-04-12 PROCEDURE — 85014 HEMATOCRIT: CPT

## 2021-04-12 PROCEDURE — C1894 INTRO/SHEATH, NON-LASER: HCPCS | Performed by: THORACIC SURGERY (CARDIOTHORACIC VASCULAR SURGERY)

## 2021-04-12 PROCEDURE — 0 IOPAMIDOL PER 1 ML: Performed by: THORACIC SURGERY (CARDIOTHORACIC VASCULAR SURGERY)

## 2021-04-12 PROCEDURE — C1769 GUIDE WIRE: HCPCS | Performed by: THORACIC SURGERY (CARDIOTHORACIC VASCULAR SURGERY)

## 2021-04-12 PROCEDURE — 25010000002 METOCLOPRAMIDE PER 10 MG: Performed by: INTERNAL MEDICINE

## 2021-04-12 PROCEDURE — 25010000002 CEFUROXIME: Performed by: PHYSICIAN ASSISTANT

## 2021-04-12 PROCEDURE — 85347 COAGULATION TIME ACTIVATED: CPT

## 2021-04-12 PROCEDURE — 94799 UNLISTED PULMONARY SVC/PX: CPT

## 2021-04-12 PROCEDURE — 85027 COMPLETE CBC AUTOMATED: CPT | Performed by: PHYSICIAN ASSISTANT

## 2021-04-12 PROCEDURE — 25010000002 MORPHINE PER 10 MG: Performed by: THORACIC SURGERY (CARDIOTHORACIC VASCULAR SURGERY)

## 2021-04-12 PROCEDURE — 25010000002 PROTAMINE SULFATE PER 10 MG: Performed by: NURSE ANESTHETIST, CERTIFIED REGISTERED

## 2021-04-12 PROCEDURE — 80048 BASIC METABOLIC PNL TOTAL CA: CPT | Performed by: PHYSICIAN ASSISTANT

## 2021-04-12 PROCEDURE — 25010000002 ONDANSETRON PER 1 MG: Performed by: NURSE ANESTHETIST, CERTIFIED REGISTERED

## 2021-04-12 PROCEDURE — 25010000002 HEPARIN (PORCINE) PER 1000 UNITS: Performed by: NURSE ANESTHETIST, CERTIFIED REGISTERED

## 2021-04-12 PROCEDURE — 84132 ASSAY OF SERUM POTASSIUM: CPT

## 2021-04-12 PROCEDURE — 93355 ECHO TRANSESOPHAGEAL (TEE): CPT

## 2021-04-12 PROCEDURE — 82803 BLOOD GASES ANY COMBINATION: CPT

## 2021-04-12 PROCEDURE — 82330 ASSAY OF CALCIUM: CPT

## 2021-04-12 PROCEDURE — C1760 CLOSURE DEV, VASC: HCPCS

## 2021-04-12 PROCEDURE — 99222 1ST HOSP IP/OBS MODERATE 55: CPT | Performed by: INTERNAL MEDICINE

## 2021-04-12 PROCEDURE — 33361 REPLACE AORTIC VALVE PERQ: CPT | Performed by: THORACIC SURGERY (CARDIOTHORACIC VASCULAR SURGERY)

## 2021-04-12 PROCEDURE — 25010000002 HEPARIN (PORCINE) PER 1000 UNITS: Performed by: THORACIC SURGERY (CARDIOTHORACIC VASCULAR SURGERY)

## 2021-04-12 PROCEDURE — 93005 ELECTROCARDIOGRAM TRACING: CPT | Performed by: PHYSICIAN ASSISTANT

## 2021-04-12 PROCEDURE — 25010000002 DEXAMETHASONE PER 1 MG: Performed by: NURSE ANESTHETIST, CERTIFIED REGISTERED

## 2021-04-12 PROCEDURE — 25010000002 ONDANSETRON PER 1 MG: Performed by: PHYSICIAN ASSISTANT

## 2021-04-12 PROCEDURE — 82947 ASSAY GLUCOSE BLOOD QUANT: CPT

## 2021-04-12 PROCEDURE — 85730 THROMBOPLASTIN TIME PARTIAL: CPT | Performed by: PHYSICIAN ASSISTANT

## 2021-04-12 PROCEDURE — 02RF38Z REPLACEMENT OF AORTIC VALVE WITH ZOOPLASTIC TISSUE, PERCUTANEOUS APPROACH: ICD-10-PCS | Performed by: THORACIC SURGERY (CARDIOTHORACIC VASCULAR SURGERY)

## 2021-04-12 PROCEDURE — 25010000002 PROPOFOL 10 MG/ML EMULSION: Performed by: NURSE ANESTHETIST, CERTIFIED REGISTERED

## 2021-04-12 DEVICE — VLV HEART TRNSCATH SAPIEN3 26MM: Type: IMPLANTABLE DEVICE | Site: HEART | Status: FUNCTIONAL

## 2021-04-12 RX ORDER — CHLORHEXIDINE GLUCONATE 0.12 MG/ML
15 RINSE ORAL ONCE
Status: COMPLETED | OUTPATIENT
Start: 2021-04-12 | End: 2021-04-12

## 2021-04-12 RX ORDER — ASPIRIN 81 MG/1
81 TABLET ORAL DAILY
Status: DISCONTINUED | OUTPATIENT
Start: 2021-04-12 | End: 2021-04-13 | Stop reason: HOSPADM

## 2021-04-12 RX ORDER — ACETAMINOPHEN 325 MG/1
650 TABLET ORAL EVERY 4 HOURS PRN
Status: DISCONTINUED | OUTPATIENT
Start: 2021-04-12 | End: 2021-04-12 | Stop reason: HOSPADM

## 2021-04-12 RX ORDER — ONDANSETRON 2 MG/ML
INJECTION INTRAMUSCULAR; INTRAVENOUS AS NEEDED
Status: DISCONTINUED | OUTPATIENT
Start: 2021-04-12 | End: 2021-04-12 | Stop reason: HOSPADM

## 2021-04-12 RX ORDER — LOSARTAN POTASSIUM 50 MG/1
100 TABLET ORAL DAILY
Status: DISCONTINUED | OUTPATIENT
Start: 2021-04-13 | End: 2021-04-13 | Stop reason: HOSPADM

## 2021-04-12 RX ORDER — PHENYLEPHRINE HCL IN 0.9% NACL 0.5 MG/5ML
.5-3 SYRINGE (ML) INTRAVENOUS ONCE
Status: DISCONTINUED | OUTPATIENT
Start: 2021-04-12 | End: 2021-04-13

## 2021-04-12 RX ORDER — ATORVASTATIN CALCIUM 20 MG/1
20 TABLET, FILM COATED ORAL DAILY
Status: DISCONTINUED | OUTPATIENT
Start: 2021-04-12 | End: 2021-04-12

## 2021-04-12 RX ORDER — SODIUM CHLORIDE 0.9 % (FLUSH) 0.9 %
10 SYRINGE (ML) INJECTION EVERY 12 HOURS SCHEDULED
Status: DISCONTINUED | OUTPATIENT
Start: 2021-04-12 | End: 2021-04-12 | Stop reason: HOSPADM

## 2021-04-12 RX ORDER — HYDRALAZINE HYDROCHLORIDE 20 MG/ML
10 INJECTION INTRAMUSCULAR; INTRAVENOUS EVERY 6 HOURS PRN
Status: DISCONTINUED | OUTPATIENT
Start: 2021-04-12 | End: 2021-04-13 | Stop reason: HOSPADM

## 2021-04-12 RX ORDER — FAMOTIDINE 20 MG/1
20 TABLET, FILM COATED ORAL ONCE
Status: COMPLETED | OUTPATIENT
Start: 2021-04-12 | End: 2021-04-12

## 2021-04-12 RX ORDER — ETOMIDATE 2 MG/ML
INJECTION INTRAVENOUS AS NEEDED
Status: DISCONTINUED | OUTPATIENT
Start: 2021-04-12 | End: 2021-04-12 | Stop reason: SURG

## 2021-04-12 RX ORDER — LABETALOL HYDROCHLORIDE 5 MG/ML
10 INJECTION, SOLUTION INTRAVENOUS
Status: DISCONTINUED | OUTPATIENT
Start: 2021-04-12 | End: 2021-04-13 | Stop reason: HOSPADM

## 2021-04-12 RX ORDER — SODIUM CHLORIDE 9 MG/ML
250 INJECTION, SOLUTION INTRAVENOUS ONCE AS NEEDED
Status: DISCONTINUED | OUTPATIENT
Start: 2021-04-12 | End: 2021-04-13 | Stop reason: HOSPADM

## 2021-04-12 RX ORDER — SODIUM CHLORIDE 9 MG/ML
INJECTION, SOLUTION INTRAVENOUS CONTINUOUS PRN
Status: DISCONTINUED | OUTPATIENT
Start: 2021-04-12 | End: 2021-04-12 | Stop reason: SURG

## 2021-04-12 RX ORDER — MORPHINE SULFATE 2 MG/ML
2 INJECTION, SOLUTION INTRAMUSCULAR; INTRAVENOUS
Status: DISCONTINUED | OUTPATIENT
Start: 2021-04-12 | End: 2021-04-13 | Stop reason: HOSPADM

## 2021-04-12 RX ORDER — SODIUM CHLORIDE, SODIUM LACTATE, POTASSIUM CHLORIDE, CALCIUM CHLORIDE 600; 310; 30; 20 MG/100ML; MG/100ML; MG/100ML; MG/100ML
9 INJECTION, SOLUTION INTRAVENOUS CONTINUOUS
Status: DISCONTINUED | OUTPATIENT
Start: 2021-04-12 | End: 2021-04-13 | Stop reason: HOSPADM

## 2021-04-12 RX ORDER — LIDOCAINE HYDROCHLORIDE 10 MG/ML
0.5 INJECTION, SOLUTION EPIDURAL; INFILTRATION; INTRACAUDAL; PERINEURAL ONCE AS NEEDED
Status: COMPLETED | OUTPATIENT
Start: 2021-04-12 | End: 2021-04-12

## 2021-04-12 RX ORDER — MAGNESIUM HYDROXIDE 1200 MG/15ML
LIQUID ORAL AS NEEDED
Status: DISCONTINUED | OUTPATIENT
Start: 2021-04-12 | End: 2021-04-12 | Stop reason: HOSPADM

## 2021-04-12 RX ORDER — CHLORHEXIDINE GLUCONATE 500 MG/1
1 CLOTH TOPICAL EVERY 12 HOURS PRN
Status: DISCONTINUED | OUTPATIENT
Start: 2021-04-12 | End: 2021-04-12 | Stop reason: HOSPADM

## 2021-04-12 RX ORDER — LABETALOL HYDROCHLORIDE 5 MG/ML
INJECTION, SOLUTION INTRAVENOUS AS NEEDED
Status: DISCONTINUED | OUTPATIENT
Start: 2021-04-12 | End: 2021-04-12 | Stop reason: HOSPADM

## 2021-04-12 RX ORDER — ROCURONIUM BROMIDE 10 MG/ML
INJECTION, SOLUTION INTRAVENOUS AS NEEDED
Status: DISCONTINUED | OUTPATIENT
Start: 2021-04-12 | End: 2021-04-12 | Stop reason: SURG

## 2021-04-12 RX ORDER — NITROGLYCERIN 0.4 MG/1
0.4 TABLET SUBLINGUAL
Status: DISCONTINUED | OUTPATIENT
Start: 2021-04-12 | End: 2021-04-12 | Stop reason: HOSPADM

## 2021-04-12 RX ORDER — METOCLOPRAMIDE HYDROCHLORIDE 5 MG/ML
5 INJECTION INTRAMUSCULAR; INTRAVENOUS ONCE
Status: COMPLETED | OUTPATIENT
Start: 2021-04-12 | End: 2021-04-12

## 2021-04-12 RX ORDER — HYDROCODONE BITARTRATE AND ACETAMINOPHEN 5; 325 MG/1; MG/1
1 TABLET ORAL EVERY 6 HOURS PRN
Status: DISCONTINUED | OUTPATIENT
Start: 2021-04-12 | End: 2021-04-13 | Stop reason: HOSPADM

## 2021-04-12 RX ORDER — PROTAMINE SULFATE 10 MG/ML
INJECTION, SOLUTION INTRAVENOUS AS NEEDED
Status: DISCONTINUED | OUTPATIENT
Start: 2021-04-12 | End: 2021-04-12 | Stop reason: SURG

## 2021-04-12 RX ORDER — SODIUM CHLORIDE, SODIUM LACTATE, POTASSIUM CHLORIDE, CALCIUM CHLORIDE 600; 310; 30; 20 MG/100ML; MG/100ML; MG/100ML; MG/100ML
INJECTION, SOLUTION INTRAVENOUS CONTINUOUS PRN
Status: DISCONTINUED | OUTPATIENT
Start: 2021-04-12 | End: 2021-04-12 | Stop reason: SURG

## 2021-04-12 RX ORDER — PROPOFOL 10 MG/ML
VIAL (ML) INTRAVENOUS AS NEEDED
Status: DISCONTINUED | OUTPATIENT
Start: 2021-04-12 | End: 2021-04-12 | Stop reason: SURG

## 2021-04-12 RX ORDER — SODIUM CHLORIDE 0.9 % (FLUSH) 0.9 %
10 SYRINGE (ML) INJECTION AS NEEDED
Status: DISCONTINUED | OUTPATIENT
Start: 2021-04-12 | End: 2021-04-12 | Stop reason: HOSPADM

## 2021-04-12 RX ORDER — SODIUM CHLORIDE 9 MG/ML
100 INJECTION, SOLUTION INTRAVENOUS CONTINUOUS
Status: ACTIVE | OUTPATIENT
Start: 2021-04-12 | End: 2021-04-12

## 2021-04-12 RX ORDER — LIDOCAINE HYDROCHLORIDE 10 MG/ML
INJECTION, SOLUTION EPIDURAL; INFILTRATION; INTRACAUDAL; PERINEURAL AS NEEDED
Status: DISCONTINUED | OUTPATIENT
Start: 2021-04-12 | End: 2021-04-12 | Stop reason: SURG

## 2021-04-12 RX ORDER — ONDANSETRON 2 MG/ML
4 INJECTION INTRAMUSCULAR; INTRAVENOUS EVERY 6 HOURS PRN
Status: DISCONTINUED | OUTPATIENT
Start: 2021-04-12 | End: 2021-04-13 | Stop reason: HOSPADM

## 2021-04-12 RX ORDER — ACETAMINOPHEN 325 MG/1
650 TABLET ORAL EVERY 4 HOURS PRN
Status: DISCONTINUED | OUTPATIENT
Start: 2021-04-12 | End: 2021-04-13 | Stop reason: HOSPADM

## 2021-04-12 RX ORDER — PHENYLEPHRINE HCL IN 0.9% NACL 0.5 MG/5ML
.5-3 SYRINGE (ML) INTRAVENOUS
Status: DISCONTINUED | OUTPATIENT
Start: 2021-04-12 | End: 2021-04-12 | Stop reason: HOSPADM

## 2021-04-12 RX ORDER — ONDANSETRON 4 MG/1
4 TABLET, FILM COATED ORAL EVERY 6 HOURS PRN
Status: DISCONTINUED | OUTPATIENT
Start: 2021-04-12 | End: 2021-04-13 | Stop reason: HOSPADM

## 2021-04-12 RX ORDER — DEXAMETHASONE SODIUM PHOSPHATE 4 MG/ML
INJECTION, SOLUTION INTRA-ARTICULAR; INTRALESIONAL; INTRAMUSCULAR; INTRAVENOUS; SOFT TISSUE AS NEEDED
Status: DISCONTINUED | OUTPATIENT
Start: 2021-04-12 | End: 2021-04-12 | Stop reason: SURG

## 2021-04-12 RX ORDER — HEPARIN SODIUM 1000 [USP'U]/ML
INJECTION, SOLUTION INTRAVENOUS; SUBCUTANEOUS AS NEEDED
Status: DISCONTINUED | OUTPATIENT
Start: 2021-04-12 | End: 2021-04-12 | Stop reason: SURG

## 2021-04-12 RX ORDER — CHOLECALCIFEROL (VITAMIN D3) 125 MCG
5 CAPSULE ORAL NIGHTLY
Status: DISCONTINUED | OUTPATIENT
Start: 2021-04-12 | End: 2021-04-13 | Stop reason: HOSPADM

## 2021-04-12 RX ORDER — ATORVASTATIN CALCIUM 20 MG/1
20 TABLET, FILM COATED ORAL NIGHTLY
Status: DISCONTINUED | OUTPATIENT
Start: 2021-04-12 | End: 2021-04-13 | Stop reason: HOSPADM

## 2021-04-12 RX ADMIN — MORPHINE SULFATE 2 MG: 2 INJECTION, SOLUTION INTRAMUSCULAR; INTRAVENOUS at 15:29

## 2021-04-12 RX ADMIN — HYDROCODONE BITARTRATE AND ACETAMINOPHEN 1 TABLET: 5; 325 TABLET ORAL at 13:52

## 2021-04-12 RX ADMIN — NICARDIPINE HYDROCHLORIDE 5 MG/HR: 0.1 INJECTION, SOLUTION INTRAVENOUS at 12:48

## 2021-04-12 RX ADMIN — NICARDIPINE HYDROCHLORIDE 5 MG/HR: 0.1 INJECTION, SOLUTION INTRAVENOUS at 13:45

## 2021-04-12 RX ADMIN — SUGAMMADEX 200 MG: 100 INJECTION, SOLUTION INTRAVENOUS at 12:40

## 2021-04-12 RX ADMIN — ROCURONIUM BROMIDE 50 MG: 10 INJECTION INTRAVENOUS at 11:45

## 2021-04-12 RX ADMIN — SODIUM CHLORIDE, POTASSIUM CHLORIDE, SODIUM LACTATE AND CALCIUM CHLORIDE 9 ML/HR: 600; 310; 30; 20 INJECTION, SOLUTION INTRAVENOUS at 10:02

## 2021-04-12 RX ADMIN — ONDANSETRON 4 MG: 2 INJECTION INTRAMUSCULAR; INTRAVENOUS at 12:36

## 2021-04-12 RX ADMIN — FAMOTIDINE 20 MG: 20 TABLET ORAL at 10:13

## 2021-04-12 RX ADMIN — ATORVASTATIN CALCIUM 20 MG: 20 TABLET, FILM COATED ORAL at 21:30

## 2021-04-12 RX ADMIN — PROTAMINE SULFATE 100 MG: 10 INJECTION, SOLUTION INTRAVENOUS at 12:33

## 2021-04-12 RX ADMIN — ASPIRIN 81 MG: 81 TABLET, COATED ORAL at 15:29

## 2021-04-12 RX ADMIN — SODIUM CHLORIDE: 9 INJECTION, SOLUTION INTRAVENOUS at 11:57

## 2021-04-12 RX ADMIN — SODIUM CHLORIDE 100 ML/HR: 9 INJECTION, SOLUTION INTRAVENOUS at 13:45

## 2021-04-12 RX ADMIN — MELATONIN TAB 5 MG 5 MG: 5 TAB at 21:30

## 2021-04-12 RX ADMIN — ETOMIDATE 40 MG: 2 INJECTION, SOLUTION INTRAVENOUS at 11:45

## 2021-04-12 RX ADMIN — SODIUM CHLORIDE, POTASSIUM CHLORIDE, SODIUM LACTATE AND CALCIUM CHLORIDE: 600; 310; 30; 20 INJECTION, SOLUTION INTRAVENOUS at 11:35

## 2021-04-12 RX ADMIN — HEPARIN SODIUM 13000 UNITS: 1000 INJECTION, SOLUTION INTRAVENOUS; SUBCUTANEOUS at 12:13

## 2021-04-12 RX ADMIN — SODIUM CHLORIDE, POTASSIUM CHLORIDE, SODIUM LACTATE AND CALCIUM CHLORIDE: 600; 310; 30; 20 INJECTION, SOLUTION INTRAVENOUS at 12:43

## 2021-04-12 RX ADMIN — METOCLOPRAMIDE 5 MG: 5 INJECTION, SOLUTION INTRAMUSCULAR; INTRAVENOUS at 13:52

## 2021-04-12 RX ADMIN — PROPOFOL 50 MG: 10 INJECTION, EMULSION INTRAVENOUS at 11:45

## 2021-04-12 RX ADMIN — NICARDIPINE HYDROCHLORIDE 7.5 MG/HR: 0.1 INJECTION, SOLUTION INTRAVENOUS at 15:49

## 2021-04-12 RX ADMIN — ROCURONIUM BROMIDE 10 MG: 10 INJECTION INTRAVENOUS at 12:25

## 2021-04-12 RX ADMIN — LIDOCAINE HYDROCHLORIDE 50 MG: 10 INJECTION, SOLUTION EPIDURAL; INFILTRATION; INTRACAUDAL; PERINEURAL at 11:45

## 2021-04-12 RX ADMIN — CEFUROXIME 1.5 G: 1.5 INJECTION, POWDER, FOR SOLUTION INTRAVENOUS at 11:45

## 2021-04-12 RX ADMIN — CHLORHEXIDINE GLUCONATE 0.12% ORAL RINSE 15 ML: 1.2 LIQUID ORAL at 10:02

## 2021-04-12 RX ADMIN — MORPHINE SULFATE 2 MG: 2 INJECTION, SOLUTION INTRAMUSCULAR; INTRAVENOUS at 13:52

## 2021-04-12 RX ADMIN — LABETALOL HYDROCHLORIDE 5 MG: 5 INJECTION, SOLUTION INTRAVENOUS at 12:30

## 2021-04-12 RX ADMIN — LIDOCAINE HYDROCHLORIDE 0.2 ML: 10 INJECTION, SOLUTION EPIDURAL; INFILTRATION; INTRACAUDAL; PERINEURAL at 10:02

## 2021-04-12 RX ADMIN — LABETALOL HYDROCHLORIDE 5 MG: 5 INJECTION, SOLUTION INTRAVENOUS at 12:42

## 2021-04-12 RX ADMIN — PROPOFOL 50 MG: 10 INJECTION, EMULSION INTRAVENOUS at 11:48

## 2021-04-12 RX ADMIN — DEXAMETHASONE SODIUM PHOSPHATE 8 MG: 4 INJECTION, SOLUTION INTRA-ARTICULAR; INTRALESIONAL; INTRAMUSCULAR; INTRAVENOUS; SOFT TISSUE at 11:54

## 2021-04-12 RX ADMIN — ONDANSETRON 4 MG: 2 INJECTION INTRAMUSCULAR; INTRAVENOUS at 13:44

## 2021-04-12 NOTE — ANESTHESIA POSTPROCEDURE EVALUATION
Patient: Fidel Roberson    Procedure Summary     Date: 04/12/21 Room / Location: [facility] OR 02 / [facility] HYBRID SHANICE    Anesthesia Start: 1135 Anesthesia Stop:     Procedures:       TRANSCATHETER AORTIC VALVE REPLACEMENT-26mm (N/A Chest)      TRANSESOPHAGEAL ECHOCARDIOGRAM WITH ANESTHESIA (N/A Chest)      TRANSCATHETER AORTIC VALVE INSERTION (N/A ) Diagnosis:       Aortic stenosis, severe      (Aortic stenosis, severe [I35.0])    Surgeons: Fidel Sandoval MD; Arie Nicholas MD Provider: Apryl Birmingham DO    Anesthesia Type: general ASA Status: 4          Anesthesia Type: general    Vitals  Vitals Value Taken Time   /89 04/12/21 1329   Temp     Pulse 71 04/12/21 1330   Resp     SpO2 98 % 04/12/21 1330   Vitals shown include unvalidated device data.        Post Anesthesia Care and Evaluation    Patient location during evaluation: PACU  Patient participation: complete - patient participated  Level of consciousness: awake  Pain score: 0  Pain management: adequate  Airway patency: patent  Anesthetic complications: No anesthetic complications  PONV Status: none  Cardiovascular status: acceptable and stable  Respiratory status: nasal cannula, unassisted, acceptable and spontaneous ventilation  Hydration status: acceptable

## 2021-04-12 NOTE — ANESTHESIA PROCEDURE NOTES
Preanesthesia Checklist:  Patient identified, IV assessed, risks and benefits discussed, monitors and equipment assessed, procedure being performed at surgeon's request and anesthesia consent obtained.    General Procedure Information  NIKKO Placed for monitoring purposes only -- This is not a diagnostic NIKKO  Physician Requesting Echo: Fidel Sandoval MD  Location performed:  OR  Intubated  Bite block placed  Heart visualized  Probe Insertion:  Easy  Probe Type:  Multiplane  Modalities:  2D only, color flow mapping, continuous wave Doppler and pulse wave Doppler    Echocardiographic and Doppler Measurements    Ventricles    Right Ventricle:  Cavity size normal.  Hypertrophy not present.  Thrombus not present.  Global function normal.    Left Ventricle:  Cavity size normal.  Hypertrophy present.  Thrombus not present.  Global Function normal.              Aorta    Ascending Aorta:  Size normal.  Dissection not present.  Plaque thickness less than 3 mm.  Mobile plaque not present.    Aortic Arch:  Size normal.  Dissection not present.  Plaque thickness less than 3 mm.  Mobile plaque not present.    Descending Aorta:  Size normal.  Dissection not present.  Plaque thickness less than 3 mm.  Mobile plaque not present.          Atria    Right Atrium:  Spontaneous echo contrast not present.  Thrombus not present.  Tumor not present.  Device not present.      Left Atrium:  Spontaneous echo contrast not present.  Thrombus not present.  Tumor not present.  Device not present.    Left atrial appendage normal.      Septa    Atrial Septum:  Intra-atrial septal morphology normal.      Ventricular Septum:  Intra-ventricular septum morphology normal.          Other Findings  Pericardium:  normal  Pleural Effusion:  none  Pulmonary Arteries:  normal      Anesthesia Information  Performed Personally      Echocardiogram Comments:       Abbreviated NIKKO for TAVR  Cristopher Arana  Baseline:   LVEF 60%, no WMA, LVH noted.  RV  function/size preserved. AV calcified, appears trileaflet, Annulus 27 lvot diam 24 mm.  Severe AS, Vmax 4.4 m/s, gradients 77/48, DI 0.21, moderate AI (P1/2t 400).  J CARLOS 0.9 by CE. No obvious PFO nor TANNER clot.  Trace MR, TR.  Post Px:  S/p TAVR 26mmhg.  No rocking motion, appears well seated without PVL. No new effusions nor WMA noted. Gradients 6/4.  Findings reported to surgical team in real time.

## 2021-04-12 NOTE — PLAN OF CARE
Goal Outcome Evaluation:        Outcome Summary: came to unit approx 1330. jimmie TAVR well. cardene started for bp. lortab and mso4 given for groin pain. zofran, then raglan given for nausea. resolved. using urinal without problem. l groin fem lines dced without problem. safeguard in place. no bleeding or hematoma. sandbag removed from r groin. no bleeding or hematoma.

## 2021-04-12 NOTE — OP NOTE
DATE OF PROCEDURE: 4/12/2021      PREOPERATIVE DIAGNOSES:  1. Severe aortic valve stenosis  2. Hypertension  3. Hyperlipidemia      POSTOPERATIVE DIAGNOSES:    1. Severe aortic valve stenosis  2. Hypertension  3. Hyperlipidemia      PROCEDURES PERFORMED:    1. Percutaneous right common femoral arterial sheath placement  2. Multiple aortograms  3. Balloon aortic valvuloplasty  4. Transcatheter aortic valve replacement (26 mm Joss 3 tissue valve)  5. Completion aortogram      SURGEON: Fidel Sandoval MD        Assistant: Timo Fuentes MD was responsible for performing the following activities: Retraction and their skilled assistance was necessary for the success of this case.    CARDIOLOGISTS:  1. Navneet Rodriguez MD  2. Arie Nicholas MD      ANESTHESIA: General endotracheal anesthesia with Dr Apryl Birmingham      ESTIMATED BLOOD LOSS: Less than 50 mL.        FLUOROSCOPY TIME: 8:30 with an exposure of 97 mGy      CONTRAST: 90 mL       INDICATIONS:  61-year-old  male with a history of hypertension, hyperlipidemia, testicular cancer and remote tobacco abuse who presented with fatigue and mild exertional dyspnea.  He was found to have severe aortic valve stenosis and was felt to be a reasonable candidate for TAVR. The risks and benefits of surgery were discussed with the patient and his family including pain, bleeding, infection, renal failure, stroke, heart block and death. The patient understood these risks and wished to proceed with surgery.       DESCRIPTION OF PROCEDURE: The patient was taken to the operating room and placed under general endotracheal anesthesia. He was prepped and draped in the usual sterile fashion. A timeout was performed including the patient’s name, procedure, and antibiotic administration. left common femoral arterial and venous lines were placed by the cardiologists for placement of a pigtail catheter and temporary venous pacer. Needle access of the right common femoral artery over the  femoral head was obtained and the incision was enlarged using a #11 blade. Systemic heparin was administered. A 7-Luxembourger dilator was then placed over the wire using modified Seldinger technique and 2 Perclose devices were deployed. A 7-Luxembourger sheath was then placed followed by a 14-Luxembourger sheath within the abdominal aorta. This was secured using a silk suture. An AL-1 catheter was utilized to cross the aortic valve with placement of a Safari wire in the left ventricle. The balloon was then placed over the wire into the aortic valve and rapid pacing was performed with satisfactory balloon valvuloplasty. The balloon was then exchanged for the 26 mm TYLER 3 valve, which was placed in the correct position at the aortic annulus. The valve was deployed after rapid pacing and was found to be in satisfactory position with no paravalvular leak on echocardiogram. Aortogram was performed that revealed no aortic regurgitation. The sheath was removed and a Magic Torque wire was left in place.  The two Perclose devices were deployed with satisfactory hemostasis.  An aortogram was then performed that revealed intact vasculature.  The groin incision was then sealed with skin glue and the left groin pigtail and pacing wire were removed. The sheaths were left in place and the patient was extubated in the operating room and transported to the cardiac ICU in stable condition.

## 2021-04-12 NOTE — NURSING NOTE
TAVR Multidisciplinary Team Meeting         Patient Name: Fidel Roberson    YOB: 1960     Referring Physician:  Emre Guzmán MD  Admission Status: Outpatient    Attendees: Andrea Rubalcava MD, Fidel Sandoval MD, Amy De La Cruz MD, aNvneet Rodriguez MD, Jeet Clinical Specialist, Eunice Garcia, Shirley LONDONO, Arie Nicholas MD and Apryl Birmingham DO    Primary presentation of AS: Heart Failure   Heart Failure:  Chronic and Diastolic    NYHA Functional Class: II    LVEF:  65%   ANNULUS Measurement: 2.5 cm per CTA (not measured on NIKKO   performed by Dr. Guzmán @ [facility])    Major Organ Compromise:   N/A    Procedure Specific Impediment:   N/A    Other Factors:  Major Nutritional Deficit: No  Cognitive Impairment: No  Oxygen dependent: No    STS Risk Score:  Mortality Risk: 0.62%  Mortality and Morbidity Risk: 5.5%    TAVR Rationale: Low risk gentleman whose coronary and vascular anatomy acceptable for TAVR.  TAVR is chosen treatment plan per patient, CT Surgery, and Cardiology.         Diagnostic Studies Discussed/ Reviewed: Cardiac cath, NIKKO, CTA TAVR    Procedure planning details:  Deployment angle cranial 7, LUCIA 0  Valve Size: 26 mm Marcano Joss 3  Cardiology sheath access: left femoral  CT Surgery sheath access: right femoral    Post Procedure Considerations: monitor for post procedure bleeding and/ro post procedure dysrhythmias      Shirley LONDONO

## 2021-04-12 NOTE — INTERVAL H&P NOTE
[facility] Pre-op    Full history and physical note from office is attached.    /83 (BP Location: Right arm, Patient Position: Lying)   Pulse 81   Temp 97.3 °F (36.3 °C) (Temporal)   Resp 18   SpO2 97%     Immunizations:  Influenza:  2020  Pneumococcal:  No  Tetanus:  UTD  Covid x2: 2021    LAB Results:  Lab Results   Component Value Date    WBC 6.68 04/09/2021    HGB 14.4 04/09/2021    HCT 45.9 04/09/2021    MCV 88.8 04/09/2021     04/09/2021    NEUTROABS 4.27 04/09/2021    GLUCOSE 167 (H) 04/09/2021    BUN 17 04/09/2021    CREATININE 1.08 04/09/2021    EGFRIFNONA 69 04/09/2021     04/09/2021    K 4.1 04/09/2021     04/09/2021    CO2 27.0 04/09/2021    MG 1.9 04/09/2021    CALCIUM 9.7 04/09/2021    ALBUMIN 4.50 04/09/2021    AST 19 04/09/2021    ALT 21 04/09/2021    BILITOT 0.4 04/09/2021    PTT 27.9 04/09/2021    INR 1.01 04/09/2021     3/16/21 CT angio:  IMPRESSION:  1. Significant atherosclerotic calcific disease of the tricuspid aortic  valve with supravalvular ascending thoracic aorta measuring 3.7 cm  maximum transaxial diameter at the level of the main pulmonary artery.  2. Atherosclerotic nonaneurysmal abdominal aorta and iliac systems with  right external iliac and common femoral minimal calcific disease greater  than left without focal severe stenosis or occlusion.  3. Nonvascular findings without acute pathology, however, noted multiple  hepatic cysts along with a fat-containing right direct inguinal hernia.    3/26/21 heart cath:  Conclusion comments:     Ms. Roberson is a 58-year-old  male with diagnosis of possible severe arctic stenosis on the transthoracic echo Doppler study.  His cardiac catheterization risk     Normal right heart pressures.  Overall mild atherosclerotic disease in all 3 coronary arteries with right dominant system.  Moderate to severe aortic stenosis based on the ease of advancing the catheter across the aortic valve although the  aortic valve area was calculated to be around 0.68 cm².  Elevated LV end-diastolic pressures possibly due to diastolic noncompliance of the left ventricle.    Cancer Staging (if applicable)  Cancer Patient: __ yes __no __unknown__N/A; If yes, clinical stage T:__ N:__M:__, stage group or __N/A      Impression: Aortic stenosis, severe      Plan: TRANSCATHETER AORTIC VALVE REPLACEMENT; TRANSESOPHAGEAL ECHOCARDIOGRAM WITH ANESTHESIA; TRANSCATHETER AORTIC VALVE INSERTION      Lyn Dawson, SPRING   4/12/2021   10:31 EDT

## 2021-04-12 NOTE — ANESTHESIA PROCEDURE NOTES
Airway  Urgency: elective    Date/Time: 4/12/2021 11:48 AM  Airway not difficult    General Information and Staff    Patient location during procedure: OR  CRNA: Osito Marks CRNA    Indications and Patient Condition  Indications for airway management: airway protection    Preoxygenated: yes  MILS not maintained throughout  Mask difficulty assessment: 1 - vent by mask    Final Airway Details  Final airway type: endotracheal airway      Successful airway: ETT  Cuffed: yes   Successful intubation technique: direct laryngoscopy  Facilitating devices/methods: intubating stylet  Endotracheal tube insertion site: oral  Blade: [place]  Blade size: 2  ETT size (mm): 7.0  Cormack-Lehane Classification: grade I - full view of glottis  Placement verified by: chest auscultation and capnometry   Cuff volume (mL): 10  Measured from: lips  ETT/EBT  to lips (cm): 23  Number of attempts at approach: 1  Assessment: lips, teeth, and gum same as pre-op and atraumatic intubation    Additional Comments  Negative epigastric sounds, Breath sound equal bilaterally with symmetric chest rise and fall

## 2021-04-12 NOTE — PROGRESS NOTES
INTENSIVIST   PROGRESS NOTE     Hospital:  LOS: 0 days       S     Mr. Fidel Roberson, 61 y.o. male is followed for:      S/P TAVR  Perioperative medical management of comorbid conditions.    As an Intensivist, we provide an integrated approach to the ICU patient and family, medical management of comorbid conditions, including but not limited to electrolytes, glycemic control, organ dysfunction, lead interdisciplinary rounds and coordinate the care with all other services, including those from other specialists.     Interval History:  POD: Day of Surgery    He was seen in 2H ICU upon arrival from OR.    He was admitted on 4/12/2021 for an elective  surgery due to Aortic Stenosis.    Events from surgery were reviewed.    At present, he is feeling so/so. Just arrived from OR. Extubated. On Nicardipine infusion at 5 mg/h.  He is feeling nauseated.  No chest pain.  No respiratory distress.    Temp  Min: 97.3 °F (36.3 °C)  Max: 97.4 °F (36.3 °C)     PMH: He  has a past medical history of Aortic valve disease, Cancer (CMS/HCC), Heart murmur, [place] (hard of hearing), Hyperlipidemia, Hypertension, Small bowel obstruction (CMS/HCC), Wears dentures, and Wears glasses.   PSxH: He  has a past surgical history that includes Testicle surgery; Small intestine surgery; Cardiac catheterization (N/A, 3/26/2021); and Colonoscopy.      Medications:  No current facility-administered medications on file prior to encounter.     Current Outpatient Medications on File Prior to Encounter   Medication Sig   • aspirin 81 MG EC tablet Take 81 mg by mouth Daily.   • losartan (COZAAR) 100 MG tablet Take 100 mg by mouth Daily.   • simvastatin (ZOCOR) 40 MG tablet Take 40 mg by mouth Every Night.   • coenzyme Q10 100 MG capsule Take 100 mg by mouth Daily.   • Omega-3 1000 MG capsule Take  by mouth Daily.       Allergies: He has No Known Allergies.   FH: His family history includes Diabetes in his mother; Heart disease in his father and mother; Stroke  in his brother.   SH: He  reports that he quit smoking about 11 years ago. His smoking use included cigarettes. He has a 30.00 pack-year smoking history. He has never used smokeless tobacco. He reports that he does not drink alcohol and does not use drugs.     The patient's relevant past medical, surgical and social history were reviewed and updated in Epic as appropriate.     ROS:   ROS cannot be reliably obtained from the patient due to his Acuity of condition.         O     Vitals:  Temp: 97.4 °F (36.3 °C) (04/12/21 1334) Temp  Min: 97.3 °F (36.3 °C)  Max: 97.4 °F (36.3 °C)   BP: 143/83 (04/12/21 1006) BP  Min: 143/83  Max: 143/83   Pulse: 70 (04/12/21 1334) Pulse  Min: 70  Max: 81   Resp: 16 (04/12/21 1334) Resp  Min: 16  Max: 18   SpO2: 97 % (04/12/21 1006) SpO2  Min: 97 %  Max: 97 %   Device: humidified, nasal cannula (04/12/21 1334)    Flow Rate: 3 (04/12/21 1334) Flow (L/min)  Min: 3  Max: 3     Intake/Ouptut 24 hrs (7:00AM - 6:59 AM)  Intake & Output (last 3 days)       04/09 0701 - 04/10 0700 04/10 0701 - 04/11 0700 04/11 0701 - 04/12 0700 04/12 0701 - 04/13 0700    I.V.    1000    Total Intake    1000    Net    +1000                Medications (drips):  lactated ringers, Last Rate: 9 mL/hr (04/12/21 1002)  niCARdipine  sodium chloride, Last Rate: 100 mL/hr (04/12/21 1345)      Physical Examination  Telemetry:  Rhythm: normal sinus rhythm (04/12/21 1334)         Constitutional:  No acute distress.   Cardiovascular: RRR.   Normal heart sounds.  No murmurs, gallop or rub.   Respiratory: Normal breath sounds  No adventitious sounds.   Abdominal:  Soft with no tenderness.  No distension.   No HSM.   Extremities: Warm.  Dry.  No cyanosis.  No Edema   Neurological:   Alert, Oriented, Cooperative.  Best Eye Response: 4-->(E4) spontaneous (04/12/21 1334)  Best Motor Response: 6-->(M6) obeys commands (04/12/21 1334)  Best Verbal Response: 5-->(V5) oriented (04/12/21 1334)  [place] Coma Scale Score: 15 (04/12/21  2974)     Hematology:  Results from last 7 days   Lab Units 04/12/21  1336 04/12/21  1153 04/09/21  1035   WBC 10*3/mm3 13.97*  --  6.68   HEMOGLOBIN g/dL 13.3  --  14.4   HEMOGLOBIN, POC g/dL  --  13.9  --    MCV fL 88.5  --  88.8   PLATELETS 10*3/mm3 172  --  232   NEUTROPHIL % %  --   --  64.1   LYMPHOCYTE % %  --   --  29.5   NEUTROS ABS 10*3/mm3  --   --  4.27   LYMPHS ABS 10*3/mm3  --   --  1.97   EOS ABS 10*3/mm3  --   --  0.03   IMMATURE GRANS (ABS) 10*3/mm3  --   --  0.01     Chemistry:  Estimated Creatinine Clearance: 84.4 mL/min (by C-G formula based on SCr of 1.08 mg/dL).  Results from last 7 days   Lab Units 04/09/21  1035   SODIUM mmol/L 139   POTASSIUM mmol/L 4.1   CHLORIDE mmol/L 104   CO2 mmol/L 27.0   BUN mg/dL 17   CREATININE mg/dL 1.08   GLUCOSE mg/dL 167*     Results from last 7 days   Lab Units 04/09/21  1035   CALCIUM mg/dL 9.7   MAGNESIUM mg/dL 1.9     Results from last 7 days   Lab Units 04/09/21  1035   ALBUMIN g/dL 4.50   BILIRUBIN mg/dL 0.4   AST (SGOT) U/L 19   ALT (SGPT) U/L 21   ALK PHOS U/L 79       COVID-19  Lab Results   Component Value Date    COVID19 Not Detected 04/09/2021    COVID19 Not Detected 03/24/2021    COVID19 Not Detected 03/10/2021    COVID19 Not Detected 02/16/2021     Images:  No radiology results for the last day    Echo:  Results for orders placed during the hospital encounter of 02/18/21    Adult Transesophageal Echo (NIKKO) W/ Cont if Necessary Per Protocol    Interpretation Summary  · All left ventricular wall segments contract normally.  · Left ventricular wall thickness is consistent with mild to moderate concentric hypertrophy.  · Left ventricular ejection fraction appears to be 66 - 70%.  · The aortic valve is abnormal in structure. There is moderate calcification of the aortic valve mainly affecting the non-coronary, left coronary and right coronary cusp(s). The aortic valve appears trileaflet. Moderate aortic valve regurgitation is present. Moderate to severe  aortic valve stenosis is present.  · Ao max PG 82.8 mmHg Ao mean PG 42 mmHg Ao V2  cm J CARLOS(I,D) 0.85 cm^2  · The mitral valve is structurally normal with no significant stenosis present. Mild mitral valve regurgitation is present.  · There is no evidence of pericardial effusion.      Results: Reviewed.  I reviewed the patient's new laboratory and imaging results.  I independently reviewed the patient's new images.    Medications: Reviewed.    Assessment/Plan   A / P     Assessment:    61 y.o.male, admitted on 4/12/2021 with Aortic stenosis, severe for a TAVR.      He had noticed some shortness of air while mowing grass and mild dizziness.  He underwent NIKKO that revealed moderate AR, moderate-severe AS, mild MR, EF 66-70%.  He was referred to Dr. Sandoval for evaluation who recommended TAVR work up.  R/LHC revealed moderate-severe AS, mild CAD, normal right heart pressures and elevated LVEDP due to diastolic noncompliance of the LV. Bilateral Carotid Duplex was unremarkable.  CTA revealed significant atherosclerotic calcific disease of the tricuspid aortic valve with supravalvular ascending thoracic aorta, atherosclerotic nonaneurysmal abdominal aorta and iliac systems with right external iliac and common femoral minimal calcific disease greater than left without focal severe stenosis or occlusion.     1. Aortic Stenosis  Procedure(s) (LRB):  TRANSCATHETER AORTIC VALVE REPLACEMENT-26mm (N/A)  TRANSESOPHAGEAL ECHOCARDIOGRAM WITH ANESTHESIA (N/A)  TRANSCATHETER AORTIC VALVE INSERTION (N/A)   Dr. Fidel Sandoval (Cardiothoracic Surgery)   04/12/21     2. Surgical antimicrobial prophylaxis: Cefuroxime   3. HTN on ARB  4. Dyslipidemia on statin.  5. Glucose:    Results from last 7 days   Lab Units 04/12/21  1330 04/12/21  1153 04/12/21  1001   GLUCOSE mg/dL 106 89 99     Lab Results   Lab Value Date/Time    HGBA1C 6.40 (H) 04/09/2021 1035       Diet: Diet Regular; Cardiac   Advance Directives: Code Status and Medical  Interventions:   Ordered at: 04/12/21 1300     Code Status:    CPR     Medical Interventions (Level of Support Prior to Arrest):    Full        Plan:    1. ICU post operative medical management.  2. Hemodynamic management. Adequate preload.  3. Urinary Output > = 0.5 mL/kg/hr  4. Watch for arrhythmias.  5. Watch for bleeding.     SPIRNG Levy, ISABELLE, STEPHENP-BC  Pulmonary and Critical Care Service    Plan of care and goals reviewed during interdisciplinary rounds.  I discussed the patient's findings and my recommendations with patient    Level of Risk is High due to:  illness with threat to life or bodily function.     I performed an independent history and physical examination. Portions of the history were obtained by SPRING Levy, ISABELLE, FNP-BC  and were modified by me according to my findings. The above note reflects my findings, assessment, and plan.

## 2021-04-12 NOTE — ANESTHESIA PREPROCEDURE EVALUATION
Anesthesia Evaluation     Patient summary reviewed and Nursing notes reviewed   no history of anesthetic complications:  NPO Solid Status: > 8 hours  NPO Liquid Status: > 8 hours           Airway   Mallampati: II  TM distance: >3 FB  Neck ROM: full  No difficulty expected  Dental      Pulmonary - normal exam    breath sounds clear to auscultation  (+) a smoker Former,   Cardiovascular     ECG reviewed  Rhythm: regular  Rate: normal    (+) hypertension, valvular problems/murmurs (critical AS) AS, YUN, murmur, systolic click, hyperlipidemia,       Neuro/Psych- negative ROS  GI/Hepatic/Renal/Endo - negative ROS     Musculoskeletal (-) negative ROS    Abdominal  - normal exam   Substance History - negative use     OB/GYN negative ob/gyn ROS         Other      history of cancer remission    ROS/Med Hx Other: Echo 1/15/2021:  ·Normal left ventricular cavity size noted. Left ventricular wall thickness is consistent with mild concentric hypertrophy.  ·All left ventricular wall segments contract normally  Left ventricular ejection fraction appears to be 61 - 65%.  ·Left ventricular diastolic function is consistent with (grade II w/high LAP) pseudonormalization.  ·The aortic valve is not well visualized. The aortic valve is abnormal in structure. There is severe calcification of the aortic valve. Mild to moderate aortic valve regurgitation is present. Severe aortic valve stenosis is present.  ·Stenosis Ao max .8 mmHg Ao mean PG 62.7 mmHg Ao V2  cm J CARLOS(I,D) 0.41 cm^2  ·There are myxomatous changes of the mitral valve apparatus present. There is anterior mitral leaflet thickening present. Mild to moderate mitral valve regurgitation is present. No significant mitral valve stenosis is present.  ·There is no evidence of pericardial effusion.  ·Comments: Patient seems to have critical aortic stenosis and may have to be considered for aortic valve replacement in the near future.                       Anesthesia  Plan    ASA 4     general   (Risks and benefits of general anesthesia discussed with patient (including MI, CVA, death, recall, aspiration), questions answered, agreeable to proceed.  [place] NIKKO)  intravenous induction     Anesthetic plan, all risks, benefits, and alternatives have been provided, discussed and informed consent has been obtained with: patient.  Use of blood products discussed with patient  Consented to blood products.   Plan discussed with CRNA.

## 2021-04-13 ENCOUNTER — APPOINTMENT (OUTPATIENT)
Dept: ULTRASOUND IMAGING | Facility: HOSPITAL | Age: 61
End: 2021-04-13

## 2021-04-13 ENCOUNTER — HOSPITAL ENCOUNTER (EMERGENCY)
Facility: HOSPITAL | Age: 61
Discharge: HOME | End: 2021-04-13
Attending: EMERGENCY MEDICINE | Admitting: EMERGENCY MEDICINE

## 2021-04-13 ENCOUNTER — DOCUMENTATION (OUTPATIENT)
Dept: CARDIAC REHAB | Facility: HOSPITAL | Age: 61
End: 2021-04-13

## 2021-04-13 ENCOUNTER — TELEPHONE (OUTPATIENT)
Dept: CARDIAC SURGERY | Facility: CLINIC | Age: 61
End: 2021-04-13

## 2021-04-13 VITALS
TEMPERATURE: 98 F | OXYGEN SATURATION: 96 % | SYSTOLIC BLOOD PRESSURE: 130 MMHG | DIASTOLIC BLOOD PRESSURE: 79 MMHG | RESPIRATION RATE: 16 BRPM | HEART RATE: 88 BPM

## 2021-04-13 VITALS
RESPIRATION RATE: 18 BRPM | OXYGEN SATURATION: 97 % | SYSTOLIC BLOOD PRESSURE: 165 MMHG | DIASTOLIC BLOOD PRESSURE: 85 MMHG | HEART RATE: 96 BPM | TEMPERATURE: 97.9 F | WEIGHT: 212 LBS | HEIGHT: 72 IN | BODY MASS INDEX: 28.71 KG/M2

## 2021-04-13 DIAGNOSIS — M79.604 RIGHT LEG PAIN: Primary | ICD-10-CM

## 2021-04-13 LAB
ALBUMIN SERPL-MCNC: 3.8 G/DL (ref 3.5–5.2)
ALBUMIN/GLOB SERPL: 1.1 G/DL
ALP SERPL-CCNC: 78 U/L (ref 39–117)
ALT SERPL W P-5'-P-CCNC: 20 U/L (ref 1–41)
ANION GAP SERPL CALCULATED.3IONS-SCNC: 10 MMOL/L (ref 5–15)
ANION GAP SERPL CALCULATED.3IONS-SCNC: 9.9 MMOL/L (ref 5–15)
AST SERPL-CCNC: 18 U/L (ref 1–40)
BASOPHILS # BLD AUTO: 0.05 10*3/MM3 (ref 0–0.2)
BASOPHILS NFR BLD AUTO: 0.3 % (ref 0–1.5)
BH BB BLOOD EXPIRATION DATE: NORMAL
BH BB BLOOD EXPIRATION DATE: NORMAL
BH BB BLOOD TYPE BARCODE: 6200
BH BB BLOOD TYPE BARCODE: 6200
BH BB DISPENSE STATUS: NORMAL
BH BB DISPENSE STATUS: NORMAL
BH BB PRODUCT CODE: NORMAL
BH BB PRODUCT CODE: NORMAL
BH BB UNIT NUMBER: NORMAL
BH BB UNIT NUMBER: NORMAL
BILIRUB SERPL-MCNC: 0.2 MG/DL (ref 0–1.2)
BILIRUB UR QL STRIP: NEGATIVE
BUN SERPL-MCNC: 17 MG/DL (ref 8–23)
BUN SERPL-MCNC: 20 MG/DL (ref 8–23)
BUN/CREAT SERPL: 19.5 (ref 7–25)
BUN/CREAT SERPL: 21.3 (ref 7–25)
CALCIUM SPEC-SCNC: 8.9 MG/DL (ref 8.6–10.5)
CALCIUM SPEC-SCNC: 9 MG/DL (ref 8.6–10.5)
CHLORIDE SERPL-SCNC: 105 MMOL/L (ref 98–107)
CHLORIDE SERPL-SCNC: 106 MMOL/L (ref 98–107)
CLARITY UR: CLEAR
CO2 SERPL-SCNC: 23 MMOL/L (ref 22–29)
CO2 SERPL-SCNC: 23.1 MMOL/L (ref 22–29)
COLOR UR: YELLOW
CREAT SERPL-MCNC: 0.87 MG/DL (ref 0.76–1.27)
CREAT SERPL-MCNC: 0.94 MG/DL (ref 0.76–1.27)
CROSSMATCH INTERPRETATION: NORMAL
CROSSMATCH INTERPRETATION: NORMAL
CRP SERPL-MCNC: 0.39 MG/DL (ref 0–0.5)
DEPRECATED RDW RBC AUTO: 44.8 FL (ref 37–54)
DEPRECATED RDW RBC AUTO: 45.6 FL (ref 37–54)
EOSINOPHIL # BLD AUTO: 0.03 10*3/MM3 (ref 0–0.4)
EOSINOPHIL NFR BLD AUTO: 0.2 % (ref 0.3–6.2)
ERYTHROCYTE [DISTWIDTH] IN BLOOD BY AUTOMATED COUNT: 14.1 % (ref 12.3–15.4)
ERYTHROCYTE [DISTWIDTH] IN BLOOD BY AUTOMATED COUNT: 14.3 % (ref 12.3–15.4)
ERYTHROCYTE [SEDIMENTATION RATE] IN BLOOD: 12 MM/HR (ref 0–20)
GFR SERPL CREATININE-BSD FRML MDRD: 81 ML/MIN/1.73
GFR SERPL CREATININE-BSD FRML MDRD: 89 ML/MIN/1.73
GLOBULIN UR ELPH-MCNC: 3.4 GM/DL
GLUCOSE SERPL-MCNC: 105 MG/DL (ref 65–99)
GLUCOSE SERPL-MCNC: 132 MG/DL (ref 65–99)
GLUCOSE UR STRIP-MCNC: NEGATIVE MG/DL
HCT VFR BLD AUTO: 41.9 % (ref 37.5–51)
HCT VFR BLD AUTO: 42.7 % (ref 37.5–51)
HGB BLD-MCNC: 13.3 G/DL (ref 13–17.7)
HGB BLD-MCNC: 13.5 G/DL (ref 13–17.7)
HGB UR QL STRIP.AUTO: NEGATIVE
IMM GRANULOCYTES # BLD AUTO: 0.06 10*3/MM3 (ref 0–0.05)
IMM GRANULOCYTES NFR BLD AUTO: 0.4 % (ref 0–0.5)
KETONES UR QL STRIP: NEGATIVE
LEUKOCYTE ESTERASE UR QL STRIP.AUTO: NEGATIVE
LYMPHOCYTES # BLD AUTO: 3.15 10*3/MM3 (ref 0.7–3.1)
LYMPHOCYTES NFR BLD AUTO: 21.7 % (ref 19.6–45.3)
MCH RBC QN AUTO: 27.5 PG (ref 26.6–33)
MCH RBC QN AUTO: 27.6 PG (ref 26.6–33)
MCHC RBC AUTO-ENTMCNC: 31.6 G/DL (ref 31.5–35.7)
MCHC RBC AUTO-ENTMCNC: 31.7 G/DL (ref 31.5–35.7)
MCV RBC AUTO: 86.7 FL (ref 79–97)
MCV RBC AUTO: 87.1 FL (ref 79–97)
MONOCYTES # BLD AUTO: 1.51 10*3/MM3 (ref 0.1–0.9)
MONOCYTES NFR BLD AUTO: 10.4 % (ref 5–12)
NEUTROPHILS NFR BLD AUTO: 67 % (ref 42.7–76)
NEUTROPHILS NFR BLD AUTO: 9.74 10*3/MM3 (ref 1.7–7)
NITRITE UR QL STRIP: NEGATIVE
NRBC BLD AUTO-RTO: 0 /100 WBC (ref 0–0.2)
NT-PROBNP SERPL-MCNC: 148.2 PG/ML (ref 0–900)
PH UR STRIP.AUTO: 6 [PH] (ref 5–8)
PLATELET # BLD AUTO: 165 10*3/MM3 (ref 140–450)
PLATELET # BLD AUTO: 173 10*3/MM3 (ref 140–450)
PMV BLD AUTO: 10 FL (ref 6–12)
PMV BLD AUTO: 10 FL (ref 6–12)
POTASSIUM SERPL-SCNC: 3.9 MMOL/L (ref 3.5–5.2)
POTASSIUM SERPL-SCNC: 4.1 MMOL/L (ref 3.5–5.2)
PROT SERPL-MCNC: 7.2 G/DL (ref 6–8.5)
PROT UR QL STRIP: NEGATIVE
QT INTERVAL: 424 MS
QTC INTERVAL: 504 MS
RBC # BLD AUTO: 4.83 10*6/MM3 (ref 4.14–5.8)
RBC # BLD AUTO: 4.9 10*6/MM3 (ref 4.14–5.8)
SODIUM SERPL-SCNC: 138 MMOL/L (ref 136–145)
SODIUM SERPL-SCNC: 139 MMOL/L (ref 136–145)
SP GR UR STRIP: 1.02 (ref 1–1.03)
TROPONIN T SERPL-MCNC: 0.07 NG/ML (ref 0–0.03)
UNIT  ABO: NORMAL
UNIT  ABO: NORMAL
UNIT  RH: NORMAL
UNIT  RH: NORMAL
UROBILINOGEN UR QL STRIP: NORMAL
WBC # BLD AUTO: 14.54 10*3/MM3 (ref 3.4–10.8)
WBC # BLD AUTO: 15.94 10*3/MM3 (ref 3.4–10.8)

## 2021-04-13 PROCEDURE — 99283 EMERGENCY DEPT VISIT LOW MDM: CPT

## 2021-04-13 PROCEDURE — 85027 COMPLETE CBC AUTOMATED: CPT | Performed by: PHYSICIAN ASSISTANT

## 2021-04-13 PROCEDURE — 99231 SBSQ HOSP IP/OBS SF/LOW 25: CPT | Performed by: INTERNAL MEDICINE

## 2021-04-13 PROCEDURE — 81003 URINALYSIS AUTO W/O SCOPE: CPT | Performed by: EMERGENCY MEDICINE

## 2021-04-13 PROCEDURE — 83880 ASSAY OF NATRIURETIC PEPTIDE: CPT | Performed by: EMERGENCY MEDICINE

## 2021-04-13 PROCEDURE — 93970 EXTREMITY STUDY: CPT | Performed by: RADIOLOGY

## 2021-04-13 PROCEDURE — 85652 RBC SED RATE AUTOMATED: CPT | Performed by: EMERGENCY MEDICINE

## 2021-04-13 PROCEDURE — 80048 BASIC METABOLIC PNL TOTAL CA: CPT | Performed by: PHYSICIAN ASSISTANT

## 2021-04-13 PROCEDURE — 80053 COMPREHEN METABOLIC PANEL: CPT | Performed by: EMERGENCY MEDICINE

## 2021-04-13 PROCEDURE — 99238 HOSP IP/OBS DSCHRG MGMT 30/<: CPT | Performed by: NURSE PRACTITIONER

## 2021-04-13 PROCEDURE — 93005 ELECTROCARDIOGRAM TRACING: CPT | Performed by: PHYSICIAN ASSISTANT

## 2021-04-13 PROCEDURE — 86140 C-REACTIVE PROTEIN: CPT | Performed by: EMERGENCY MEDICINE

## 2021-04-13 PROCEDURE — 85025 COMPLETE CBC W/AUTO DIFF WBC: CPT | Performed by: EMERGENCY MEDICINE

## 2021-04-13 PROCEDURE — 84484 ASSAY OF TROPONIN QUANT: CPT | Performed by: EMERGENCY MEDICINE

## 2021-04-13 PROCEDURE — 93970 EXTREMITY STUDY: CPT

## 2021-04-13 RX ADMIN — LOSARTAN POTASSIUM 100 MG: 50 TABLET, FILM COATED ORAL at 08:14

## 2021-04-13 RX ADMIN — ASPIRIN 81 MG: 81 TABLET, COATED ORAL at 08:14

## 2021-04-13 NOTE — PROGRESS NOTES
Multidisciplinary Rounds    Time: 20min  Patient Name: Fidel Roberson  Date of Encounter: 04/13/21 10:21 EDT  MRN: [id number]  Admission date: 4/12/2021      Reason for visit: MDR. RD to continue to follow per protocol.     Additional information obtained during MDR: Pt s/p TAVR yesterday (4/12).     Current diet: Diet Regular; Cardiac      Intervention:  Follow treatment plan  Care plan reviewed    Follow up:   Per protocol      Christa Singletary MS RD/[facility]P [facility]  10:21 EDT

## 2021-04-13 NOTE — PROGRESS NOTES
CTS Progress Note      POD #1 s/p TAVR       LOS: 1 day   Patient Care Team:  Jonathan Fernandez MD as PCP - General (Internal Medicine)    Subjective  Patient doing well, ambulated 220 ft last night.     Objective    Vital Signs  Temp:  [97.3 °F (36.3 °C)-98 °F (36.7 °C)] 98 °F (36.7 °C)  Heart Rate:  [69-98] 96  Resp:  [16-20] 16  BP: (100-149)/(59-89) 149/86  Arterial Line BP: (100-142)/(48-75) 111/55    Physical Exam:   General Appearance: alert, appears stated age and cooperative   Lungs: clear to auscultation, respirations regular, respirations even and respirations unlabored   Heart: regular rate and rhythm   Skin: Incision c/d/i     Results   Results from last 7 days   Lab Units 04/13/21  0311   WBC 10*3/mm3 15.94*   HEMOGLOBIN g/dL 13.3   HEMATOCRIT % 41.9   PLATELETS 10*3/mm3 173     Results from last 7 days   Lab Units 04/13/21  0311   SODIUM mmol/L 138   POTASSIUM mmol/L 4.1   CHLORIDE mmol/L 105   CO2 mmol/L 23.0   BUN mg/dL 17   CREATININE mg/dL 0.87   GLUCOSE mg/dL 132*   CALCIUM mg/dL 8.9           Imaging Results (Last 24 Hours)     ** No results found for the last 24 hours. **          Assessment      TAVR 04/12/21    Hypertension    Hyperlipidemia      Plan   DC home today      Glory Keating PA-C  04/13/21  09:12 EDT

## 2021-04-13 NOTE — DISCHARGE SUMMARY
Discharge Summary TAVR    Date of Admission: 4/12/2021  Date of Discharge:  4/13/2021    PCP: Jonathan Fernandez MD  ATTENDING: Fidel Sandoval MD  Primary Cardiologist: Emre Guzmán MD      TAVR Team  1.  Andrea Rubalcava MD  2.  Fidel Sandoval MD  3.  Navneet Rodriguez MD  4.  Amy De La Cruz MD  5.  Arie Nicholas MD  6.  Aaron Byran MD    Presenting Problem/ HPI: Patient is a 61 y.o.  gentleman from [place], [place]. cardiac murmur was noted by primary care provider and he was referred to Dr. Guzmán following an echocardiogram which was noted to be abnormal representing severe aortic stenosis.  Patient exhibited symptoms of reduced exercise tolerance, fatigue and some mild dyspnea on exertion.  Dr. Guzmán confirmed the aortic valve dysfunction via transesophageal echocardiogram on 2/19/2020.  He was then referred to Dr. Fidel Sandoval for CT surgery evaluation.  Plans were then made for TAVR.      Discharge Diagnosis:     1.  TAVR 04/12/21    2.  Hypertension with chronic diastolic heart failure NYHA class I    3.  Hyperlipidemia      Procedures Performed:   TRANSCATHETER AORTIC VALVE REPLACEMENT-26mm      Hospital Course:The patient is an 61 y.o. male from [address] [place] [address] with symptoms of severe aortic stenosis including YUN and reduced exercise tolerance which began to worsen for approximate 1 year.  An echocardiogram was done 2/4/2021 which revealed the aortic valve indices to meet TAVR criteria, which included the aortic valve area of 0.4 sq cm, mean gradient 62 mmHg and aortic valve V-max 5.5 m/sec.   He was evaluated by John Paul Sandoval and Ramirez and deemed to be low risk of mortality with traditional open AVR primarily based upon STS score 0.62%. he was admitted the morning of the scheduled OR procedure on 4/12/21.    Mr. Roberson was taken to the operating room in fasting condition and placed under general anesthesia.  Dr. Arie Nicholas placed a 6 Tamazight left femoral arterial sheath  and then an 8 Liberian left femoral venous sheath.  Using the venous access a temporary pacing catheter was taken up to the right ventricular apex for rapid ventricular pacing.  A 6 Liberian pigtail catheter was taken up in the left femoral arterial sheath to the ascending aorta for intraprocedural aortograms.  At the same time the surgeons John Paul Sandoval and Dr. Timo Fuentes gave needle access to the right common femoral artery over the femoral head.  Systemic heparin administered.  7 Liberian dilator placed over the wire using modified Seldinger technique and 2 Perclose deployed.  A 7 Liberian sheath was placed followed by the 14 Liberian sheath within the abdominal aorta.  This was secured with silk suture.  The AL-1 catheter was utilized to cross the aortic valve with placement of safari wire in the left ventricle.  The balloon was then placed over the wire into the aortic valve where rapid pacing was performed for satisfactory balloon valvuloplasty.  The balloon was exchanged for the 26 mm Marcano jorge 3 prosthesis.  This was placed in the correct position of the aortic annulus completed rapid pacing protocol.  Post implant aortogram and NIKKO revealed no aortic regurgitation or significant paravalvular leak.  The sheath was then removed and a Magic torque wire left in place.  2 Perclose deployed.  Aortogram then performed to reveal intact vasculature.  Pigtail catheter and pacing wires removed from left groin.  Sheath left in place.  Patient explained operating the transported to cardiothoracic ICU in stable addition.    Upon arrival in CT ICU Mr. Valdes was evaluated with nursing staff and intensivist, Dr. Roland Chavez.  Low-dose nicardipine infusion at 5 mg/h utilized to maintain systolic blood pressure below 140 mmHg.  Patient complained of nausea but was otherwise in no acute distress.  Stable vital signs.  Normal sinus rhythm per bedside telemetry.  Stable hemoglobin as compared to preadmission testing labs with  stable electrolytes and renal function.  Zofran and Reglan administered for resolution of nausea.  The left femoral venous and arterial sheaths were discontinued with nursing staff without formation of hematoma or significant bleeding.  Patient completed 6 hours of bedrest and ambulated with nursing staff.  Once Cardene was discontinued patient's blood pressure remained within acceptable parameters.    Postop day #1: No acute overnight events.  Patient up to chair early in the morning.  A.m. labs revealed stable hemoglobin and hematocrit as well as platelet count and electrolytes.  Renal function normal.  Cardiac rhythm remained stable normal sinus with occasional PAC.  Groin access sites remained free from bleeding or hematoma.  Patient again ambulated without difficulty.  He was deemed stable and ready for discharge home.  Plans include 1 week telemedicine visit with TAVR clinic, 1 month follow-up with Dr. Arie Nicholas for postop visit and echocardiogram, then 3 to 4-week follow-up with CT surgery.           Physical Exam on date of discharge:     Vital Sign Min/Max for last 24 hours  Temp  Min: 97.3 °F (36.3 °C)  Max: 97.8 °F (36.6 °C)   BP  Min: 100/64  Max: 143/83   Pulse  Min: 69  Max: 89   Resp  Min: 16  Max: 20   SpO2  Min: 92 %  Max: 99 %   Flow (L/min)  Min: 2  Max: 3   No data recorded          Physical Exam:  General Appearance: Alert, appears stated age and cooperative.  Up in chair  Lungs: Clear to auscultation  Heart:: Soft systolic murmur right upper sternal border   no Murmurs, Rubs or Gallops  Abdomen: Soft and nontender with adequate bowel sounds. Extremities: No cyanosis, clubbing or edema  Pulses: Pulses palpable and equal bilaterally  Skin: Both groin sites are clean and dry.  There are no hematomas or bleeding  Psych: Normal        Pertinent Test Results: Basic Metabolic Panel       Component   Ref Range & Units 03:11   (4/13/21) 1 d ago   (4/12/21) 1 d ago   (4/12/21) 1 d ago   (4/12/21)  1 d ago   (4/12/21)   Glucose   65 - 99 mg/dL 132High   124High   106 R  89 R  99 R    BUN   8 - 23 mg/dL 17  16       Creatinine   0.76 - 1.27 mg/dL 0.87  1.02       Sodium   136 - 145 mmol/L 138  139       Potassium   3.5 - 5.2 mmol/L 4.1  4.1 CM       Chloride   98 - 107 mmol/L 105  105       CO2   22.0 - 29.0 mmol/L 23.0  25.0       Calcium   8.6 - 10.5 mg/dL 8.9  8.3Low        eGFR Non African Amer   >60 mL/min/1.73 89  74               CBC (No Diff)       Component   Ref Range & Units 03:11 1 d ago 4 d ago 2 wk ago 1 mo ago   WBC   3.40 - 10.80 10*3/mm3 15.94High   13.97High   6.68  7.92  7.16    RBC   4.14 - 5.80 10*6/mm3 4.83  4.69  5.17  5.41  5.06    Hemoglobin   13.0 - 17.7 g/dL 13.3  13.3  14.4  15.2  13.9    Hematocrit   37.5 - 51.0 % 41.9  41.5  45.9  48.1  44.3    MCV   79.0 - 97.0 fL 86.7  88.5  88.8  88.9  87.5    MCH   26.6 - 33.0 pg 27.5  28.4  27.9  28.1  27.5    MCHC   31.5 - 35.7 g/dL 31.7  32.0  31.4Low   31.6  31.4Low     RDW   12.3 - 15.4 % 14.1  14.2  13.9  14.3  13.6    RDW-SD   37.0 - 54.0 fl 44.8  45.5  44.8  46.3  43.3    MPV   6.0 - 12.0 fL 10.0  9.8  9.5  9.7  10.1    Platelets   140 - 450 10*3/mm3 173  172  232  211  212                Discharge Disposition: home with family    Discharge Medications     Discharge Medications      Continue These Medications      Instructions Start Date   aspirin 81 MG EC tablet   81 mg, Oral, Daily      coenzyme Q10 100 MG capsule   100 mg, Oral, Daily      losartan 100 MG tablet  Commonly known as: COZAAR   100 mg, Oral, Daily      melatonin 5 MG tablet tablet   5 mg, Oral, Nightly      Omega-3 1000 MG capsule   Oral, Daily      Zocor 40 MG tablet  Generic drug: simvastatin   40 mg, Oral, Nightly             Discharge Diet:   Diet Instructions     Diet: Cardiac      Discharge Diet: Cardiac      Cardiac Low Sodium    Special Instructions:   1.  Incision care: Check groin sites daily. Clean sites daily with a clean washcloth, soap and water. Pat dry.   Report redness, drainage, swelling or significant tenderness to [facility] CT Surgery or to Shirley LONDONO at [facility].   2.  Patient may shower, but no tub baths until CT Surgery followup.   3.  Walk daily starting with 5 minutes four times daily.  Increase walking by one minute per walk as tolerated.    4.  No lifting over 10 lbs until CT Surgery follow up.  5.  No driving until released to do so by the surgeon.   6.  Weigh daily and report weight gain of 3 pounds overnight or 5 pounds in a week to [facility].   7.  Report symptoms of increased shortness of breath, chest pain, or temperature greater than 100.5 degrees to [facility] at [phone] or [facility] CT Surgery [phone].    Follow-up Appointments  Future Appointments   Date Time Provider Department Center   4/19/2021 10:45 AM Shirley Kenyon APRN [facility]   5/4/2021  2:30 PM Ashley Colon APRN [facility] None   5/12/2021  3:15 PM Cecilia Peng APRN [facility] None     Additional Instructions for the Follow-ups that You Need to Schedule     Discharge Follow-up with Specialty: Cardiothoracic Surgery   As directed      Follow Up Details: Follow Up in 2-4 Weeks    Specialty: Cardiothoracic Surgery         Discharge Follow-up with Specialty: Heart & Valve Center; 1 Week   As directed      Specialty: [facility] Center    Follow Up: 1 Week    Follow Up Details: Follow Up With Heart & [facility]ve Center Within 7 Days of Discharge. If Discharged on a Weekend, Schedule Follow Up For The Following Friday at 0900.               Thank you for allowing the [facility] TAVR team to care for Mr. Fidel Roberson.  If you have any questions or concerns please call Shirley LONDONO at [facility] [phone].    Lori may be reached at [phone] and Dr. Nicholas may be reached at [phone].       Shirley Kenyon, APRN  04/14/21  15:46 EDT

## 2021-04-13 NOTE — PROGRESS NOTES
[facility] Progress Note      LOS: 1 day   Patient Care Team:  Jonathan Fernandez MD as PCP - General (Internal Medicine)    Chief Complaint: YUN    Subjective     Interval History: Quiet night    Patient Complaints: No chest pain or dyspnea      Review of Systems:   A 14 point review of systems was negative except as was stated in the HPI      Objective     Vital Sign Min/Max for last 24 hours  Temp  Min: 97.3 °F (36.3 °C)  Max: 97.8 °F (36.6 °C)   BP  Min: 100/64  Max: 143/83   Pulse  Min: 69  Max: 89   Resp  Min: 16  Max: 20   SpO2  Min: 92 %  Max: 99 %   Flow (L/min)  Min: 2  Max: 3   No data recorded         Physical Exam:  General Appearance: Alert, appears stated age and cooperative  Lungs: Clear to auscultation  Heart:: Soft systolic murmur right upper sternal border   no Murmurs, Rubs or Gallops  Abdomen: Soft and nontender with adequate bowel sounds.  No organomegaly  Extremities: No cyanosis, clubbing or edema  Pulses: Pulses palpable and equal bilaterally  Skin: Warm and dry with no rash  Psych: Normal  Both groin sites are clean and dry.  There are no hematomas or bleeding     Results Review:     I reviewed the patient's new clinical results.  Results from last 7 days   Lab Units 04/13/21  0311 04/12/21  1335 04/09/21  1035   SODIUM mmol/L 138 139 139   POTASSIUM mmol/L 4.1 4.1 4.1   CHLORIDE mmol/L 105 105 104   CO2 mmol/L 23.0 25.0 27.0   BUN mg/dL 17 16 17   CREATININE mg/dL 0.87 1.02 1.08   GLUCOSE mg/dL 132* 124* 167*   CALCIUM mg/dL 8.9 8.3* 9.7     Results from last 7 days   Lab Units 04/13/21  0311 04/12/21  1336 04/12/21  1153 04/09/21  1035   WBC 10*3/mm3 15.94* 13.97*  --  6.68   HEMOGLOBIN g/dL 13.3 13.3  --  14.4   HEMOGLOBIN, POC g/dL  --   --  13.9  --    HEMATOCRIT % 41.9 41.5  --  45.9   HEMATOCRIT POC %  --   --  41  --    PLATELETS 10*3/mm3 173 172  --  232     No results found for: TROPONINT      Results from last 7 days   Lab Units  04/09/21  1035   INR  1.01     Results from last 7 days   Lab Units 04/12/21  1153   PH, ARTERIAL pH units 7.40           Medication Review: yes  Current Facility-Administered Medications   Medication Dose Route Frequency Provider Last Rate Last Admin   • acetaminophen (TYLENOL) tablet 650 mg  650 mg Oral Q4H PRN Glory Keating PA-C       • aspirin EC tablet 81 mg  81 mg Oral Daily Glory Keating PA-C   81 mg at 04/12/21 1529   • atorvastatin (LIPITOR) tablet 20 mg  20 mg Oral Nightly Glory Keating PA-C   20 mg at 04/12/21 2130   • atropine sulfate injection 0.5 mg  0.5 mg Intravenous Q5 Min PRN Glory Keating PA-C       • hydrALAZINE (APRESOLINE) injection 10 mg  10 mg Intravenous Q6H PRN Glory Keating PA-C       • HYDROcodone-acetaminophen (NORCO) 5-325 MG per tablet 1 tablet  1 tablet Oral Q6H PRN Fidel Sandoval MD   1 tablet at 04/12/21 1352   • labetalol (NORMODYNE,TRANDATE) injection 10 mg  10 mg Intravenous Q30 Min PRN Glory Keating PA-C       • lactated ringers infusion  9 mL/hr Intravenous Continuous Glory Keating PA-C 9 mL/hr at 04/12/21 1002 9 mL/hr at 04/12/21 1002   • losartan (COZAAR) tablet 100 mg  100 mg Oral Daily Glory Keating PA-C       • melatonin tablet 5 mg  5 mg Oral Nightly Glory Keating PA-C   5 mg at 04/12/21 2130   • morphine injection 2 mg  2 mg Intravenous Q3H PRN Fidel Sandoval MD   2 mg at 04/12/21 1529   • ondansetron (ZOFRAN) tablet 4 mg  4 mg Oral Q6H PRN Glory Keating PA-C        Or   • ondansetron (ZOFRAN) injection 4 mg  4 mg Intravenous Q6H PRN Glory Keating PA-C   4 mg at 04/12/21 1344   • phenylephrine (AQUILES-SYNEPHRINE) 50 mg in 250 mL NS infusion  0.5-3 mcg/kg/min Intravenous Once Glory Keating PA-C       • sodium chloride 0.9 % infusion 250 mL  250 mL Intravenous Once PRN Glory Keating PA-C         Facility-Administered Medications Ordered in Other Encounters   Medication Dose Route Frequency Provider Last Rate Last Admin   • Chlorhexidine Gluconate  Cloth 2 % pads 1 application  1 application Topical Q12H PRN Boni Rojas PA             TAVR 04/12/21    Hypertension    Hyperlipidemia        Impression      Doing well postop day 1 TAVR        Plan     Home today.  We will see in the office in 1 month for echocardiography        Arie Nicholas MD   04/13/21  07:04 EDT

## 2021-04-13 NOTE — PLAN OF CARE
Goal Outcome Evaluation:  Plan of Care Reviewed With: patient  Progress: improving  Pt intact and Orientedx4, has some anxiety but doing well. Pt on room air now >93%, lungs clear. Pt SR w/ some rare PACs, s1s2. Pt has good appitite and great urine output overnight. Both groin sites soft dry and intact. Pt was off bedrest @2200, pt walked 220ft with no problems. Pt has not complained of any pain. D/C art line this morning. Pt VSS, will continue to monitor.

## 2021-04-13 NOTE — DISCHARGE INSTRUCTIONS
Special Instructions:   1.  Incision care: Check groin sites daily. Clean sites daily with a clean washcloth, soap and water. Pat dry.  Report redness, drainage, swelling or significant tenderness to [facility] CT Surgery or to Shirley LONDONO at [facility].   2.  Patient may shower, but no tub baths until CT Surgery followup.   3.  Walk daily starting with 5 minutes four times daily.  Increase walking by one minute per walk as tolerated.    4.  No lifting over 10 lbs until CT Surgery follow up.  5.  No driving until released to do so by the surgeon.   6.  Weigh daily and report weight gain of 3 pounds overnight or 5 pounds in a week to [facility].   7.  Report symptoms of increased shortness of breath, chest pain, or temperature greater than 100.5 degrees to [facility] at [phone] or [facility] CT Surgery [phone].

## 2021-04-13 NOTE — TELEPHONE ENCOUNTER
Pts daughter called the office and stated that the pt was having severe leg pain.     Dr. graham denied prescribing pain medications to the pt for the the type of surgery he had done. The pt stated he is having severe pain in his RT leg around his knee cap, denies swelling and denies color changes. Pt has been talking to SPRING Kelley about this earlier today and I explained that he would probably need to see her in an office visit. He stated since he couldn't get into her office today that he would go to the [facility] if he wasn't feeling better.

## 2021-04-13 NOTE — NURSING NOTE
Pt. Referred for Phase II Cardiac Rehab. Staff discussed benefits of exercise, program protocol, and educational material provided. Teach back verified.  Permission granted from patient for staff to fax referral information to outlying program at this time.  Staff faxed referral info to  [facility].

## 2021-04-13 NOTE — PROGRESS NOTES
Cardiac Rehab referral received;   Due to the Covid 19 pandemic [facility]  is open but we are social distancing that means space is limited  We will do our best to get  the patients that want to participate into the program scheduled. Patients are being notified of the the restrictions and  being placed on a waiting list at this time and if they are interested staff will schedule patients as soon as possible.    Staff will contact patient and schedule as soon as possible.

## 2021-04-13 NOTE — ED NOTES
Report to yemi medina triage nurse, pt remains awaiting in er lobby due to pt volume, pt was instr. To notify staff of any changes, verb understanding     Steffany Jeronimo RN  04/13/21 1939

## 2021-04-14 LAB — ACT BLD: 109 SECONDS (ref 82–152)

## 2021-04-14 NOTE — ED PROVIDER NOTES
Subjective     History provided by:  Patient   used: No    Leg Pain  Location:  Leg  Injury: no    Leg location:  R leg and R lower leg  Pain details:     Quality:  Aching, cramping and dull    Radiates to:  Does not radiate    Severity:  Mild    Onset quality:  Sudden    Timing:  Sporadic    Progression:  Improving  Chronicity:  New  Dislocation: no    Foreign body present:  No foreign bodies  Tetanus status:  Up to date  Prior injury to area:  No  Relieved by:  Nothing  Worsened by:  Nothing  Ineffective treatments:  None tried  Associated symptoms: no back pain, no decreased ROM, no fatigue, no fever, no itching, no muscle weakness, no neck pain, no numbness, no stiffness, no swelling and no tingling    Risk factors: no concern for non-accidental trauma, no frequent fractures, no known bone disorder, no obesity and no recent illness        Review of Systems   Constitutional: Negative for activity change, appetite change, chills, diaphoresis, fatigue and fever.   HENT: Negative for congestion, ear pain and sore throat.    Eyes: Negative for redness.   Respiratory: Negative for cough, chest tightness, shortness of breath and wheezing.    Cardiovascular: Negative for chest pain, palpitations and leg swelling.   Gastrointestinal: Negative for abdominal pain, diarrhea, nausea and vomiting.   Genitourinary: Negative for dysuria and urgency.   Musculoskeletal: Negative for arthralgias, back pain, myalgias, neck pain and stiffness.   Skin: Negative for itching, pallor, rash and wound.   Neurological: Negative for dizziness, speech difficulty, weakness and headaches.   Psychiatric/Behavioral: Negative for agitation, behavioral problems, confusion and decreased concentration.   All other systems reviewed and are negative.      Past Medical History:   Diagnosis Date   • Aortic valve disease    • Cancer (CMS/HCC)     testicular   • Elevated hemoglobin A1c    • Heart murmur    • [place] (hard of hearing)      hearing aides   • Hyperlipidemia    • Hypertension    • Small bowel obstruction (CMS/HCC)    • Wears dentures     top   • Wears glasses        No Known Allergies    Past Surgical History:   Procedure Laterality Date   • AORTIC VALVE REPAIR/REPLACEMENT N/A 2021    Procedure: TRANSCATHETER AORTIC VALVE REPLACEMENT;  Surgeon: Fidel Sandoval MD;  Location:  [facility];  Service: Cardiothoracic;  Laterality: N/A;  90mls [id number]  8mins 30 secs  97mGy   • AORTIC VALVE REPAIR/REPLACEMENT N/A 2021    Procedure: TRANSCATHETER AORTIC VALVE INSERTION;  Surgeon: Arie Nicholas MD;  Location: [facility];  Service: Cardiovascular;  Laterality: N/A;   • CARDIAC CATHETERIZATION N/A 3/26/2021    Procedure: Right and Left Heart Cath;  Surgeon: Emre Guzmán MD;  Location: [facility] CATH INVASIVE LOCATION;  Service: Cardiology;  Laterality: N/A;   • COLONOSCOPY     • SMALL INTESTINE SURGERY         • TESTICLE SURGERY     • TRANSESOPHAGEAL ECHOCARDIOGRAM (NIKKO) N/A 2021    Procedure: TRANSESOPHAGEAL ECHOCARDIOGRAM WITH ANESTHESIA;  Surgeon: Fidel Sandoval MD;  Location:  [facility];  Service: Cardiothoracic;  Laterality: N/A;       Family History   Problem Relation Age of Onset   • Diabetes Mother    • Heart disease Mother    • Heart disease Father    • Stroke Brother        Social History     Socioeconomic History   • Marital status:      Spouse name: Deloris   • Number of children: 1   • Years of education: HS   • Highest education level: Not on file   Tobacco Use   • Smoking status: Former Smoker     Packs/day: 1.00     Years: 30.00     Pack years: 30.00     Types: Cigarettes     Quit date: 2010     Years since quittin.2   • Smokeless tobacco: Never Used   Substance and Sexual Activity   • Alcohol use: No   • Drug use: No   • Sexual activity: Defer           Objective   Physical Exam  Vitals and nursing note reviewed.   Constitutional:       General: He is not in acute  distress.     Appearance: Normal appearance. He is well-developed. He is not toxic-appearing or diaphoretic.   HENT:      Head: Normocephalic and atraumatic.      Right Ear: External ear normal.      Left Ear: External ear normal.      Nose: Nose normal.      Mouth/Throat:      Pharynx: No oropharyngeal exudate.      Tonsils: No tonsillar exudate.   Eyes:      General: Lids are normal.      Conjunctiva/sclera: Conjunctivae normal.      Pupils: Pupils are equal, round, and reactive to light.   Neck:      Thyroid: No thyromegaly.   Cardiovascular:      Rate and Rhythm: Normal rate and regular rhythm.      Pulses: Normal pulses.      Heart sounds: Normal heart sounds, S1 normal and S2 normal.   Pulmonary:      Effort: Pulmonary effort is normal. No tachypnea or respiratory distress.      Breath sounds: Normal breath sounds. No decreased breath sounds, wheezing or rales.   Chest:      Chest wall: No tenderness.   Abdominal:      General: Bowel sounds are normal. There is no distension.      Palpations: Abdomen is soft.      Tenderness: There is no abdominal tenderness. There is no guarding or rebound.   Musculoskeletal:         General: Tenderness present. No deformity. Normal range of motion.      Cervical back: Full passive range of motion without pain, normal range of motion and neck supple.        Legs:    Lymphadenopathy:      Cervical: No cervical adenopathy.   Skin:     General: Skin is warm and dry.      Coloration: Skin is not pale.      Findings: No erythema or rash.   Neurological:      Mental Status: He is alert and oriented to person, place, and time.      GCS: GCS eye subscore is 4. GCS verbal subscore is 5. GCS motor subscore is 6.      Cranial Nerves: No cranial nerve deficit.      Sensory: No sensory deficit.   Psychiatric:         Speech: Speech normal.         Behavior: Behavior normal.         Thought Content: Thought content normal.         Judgment: Judgment normal.         Procedures           ED  Course  ED Course as of Apr 14 0527   Tue Apr 13, 2021 2102 IMPRESSION:  No DVT in the lower extremities on today's exam.    US Venous Doppler Lower Extremity Bilateral (duplex) [ES]      ED Course User Index  [ES] Kobe Jovel MD                                           MDM  Number of Diagnoses or Management Options  Right leg pain: new and requires workup     Amount and/or Complexity of Data Reviewed  Clinical lab tests: reviewed and ordered  Tests in the radiology section of CPT®: reviewed and ordered  Tests in the medicine section of CPT®: ordered and reviewed  Review and summarize past medical records: yes  Independent visualization of images, tracings, or specimens: yes    Risk of Complications, Morbidity, and/or Mortality  Presenting problems: moderate  Diagnostic procedures: moderate  Management options: moderate    Patient Progress  Patient progress: stable      Final diagnoses:   Right leg pain       ED Disposition  ED Disposition     ED Disposition Condition Comment    Discharge Stable           Jonathan Fernandez MD  [address] [facility] [address]  [phone]    Schedule an appointment as soon as possible for a visit in 1 day  REEVALUATE         Medication List      No changes were made to your prescriptions during this visit.          Kobe Jovel MD  04/14/21 0570

## 2021-04-19 ENCOUNTER — OFFICE VISIT (OUTPATIENT)
Dept: CARDIOLOGY | Facility: HOSPITAL | Age: 61
End: 2021-04-19

## 2021-04-19 VITALS
HEIGHT: 72 IN | WEIGHT: 208 LBS | BODY MASS INDEX: 28.17 KG/M2 | SYSTOLIC BLOOD PRESSURE: 127 MMHG | DIASTOLIC BLOOD PRESSURE: 73 MMHG

## 2021-04-19 DIAGNOSIS — I35.0 NONRHEUMATIC AORTIC VALVE STENOSIS: Primary | ICD-10-CM

## 2021-04-19 DIAGNOSIS — I35.0 AORTIC STENOSIS, SEVERE: ICD-10-CM

## 2021-04-19 DIAGNOSIS — I35.9 AVD (AORTIC VALVE DISEASE): ICD-10-CM

## 2021-04-19 DIAGNOSIS — I10 ESSENTIAL HYPERTENSION: ICD-10-CM

## 2021-04-19 PROCEDURE — 99442 PR PHYS/QHP TELEPHONE EVALUATION 11-20 MIN: CPT | Performed by: NURSE PRACTITIONER

## 2021-04-19 NOTE — PROGRESS NOTES
"You have chosen to receive care through the use of telemedicine. Telemedicine enables health care providers at different locations to provide safe, effective, and convenient care through the use of technology. As with any health care service, there are risks associated with the use of telemedicine, including equipment failure, poor connections, and  issues.    • Do you understand the risks and benefits of telemedicine as I have explained them to you? Yes  • Have your questions regarding telemedicine been answered? Yes  • Do you consent to the use of telemedicine in your medical care today? Yes      Chief Complaint  Establish Care and Post-op (TAVR)    Subjective    History of Present Illness {CC  Problem List  Visit  Diagnosis   Encounters  Notes  Medications  Labs  Result Review Imaging  Media :23}     Fidel Roberson presents to [facility] CARDIOLOGY for   Mr. Roberson is s/p TAVR on 4/12/21.  DC home on 4/13.  Attends short interval follow up via telephone visit.      Overall patient states he is feeling much better.  He relates walking approximately 1/4 mile several times per day.  No issues with groin access sites or dyspnea.  Right radial arterial line site is \"sore but getting better\". He also experienced severe right posterior lower leg pain on 4/13 and was concerned for DVT.  He presented to [facility] ER and doppler study was WNL.  That pain has completely resolved.      Finally, he relates having some hypotensive episodes.  Though asymptomatic, he is worried about low BP.  This generally occurs in early AM.  It is his habit to get up for the day around 4 AM.  He has measured four mornings since DC when SBP has been  mm Hg.         Objective     Vital Signs:   Vitals:    04/19/21 1030   BP: 127/73   BP Location: Left arm   Patient Position: Sitting   Weight: 94.3 kg (208 lb)   Height: 182.9 cm (72\")     Body mass index is 28.21 kg/m².  Physical " Exam  Vitals reviewed.   Constitutional:       General: He is not in acute distress.  Pulmonary:      Effort: Pulmonary effort is normal.   Neurological:      General: No focal deficit present.      Mental Status: He is alert and oriented to person, place, and time.   Psychiatric:         Mood and Affect: Mood normal.         Behavior: Behavior normal.        Result Review  Data Reviewed:{ Labs  Result Review  Imaging  Med Tab  Media :23}     Recent hospitalization notes 4/12/21- 4/13/21            Assessment and Plan {CC Problem List  Visit Diagnosis  ROS  Review (Popup)  Health Maintenance  Quality  BestPractice  Medications  SmartSets  SnapShot Encounters  Media :23}   1. Nonrheumatic aortic valve stenosis s/p TAVR 4/12/21  - Recovering well at home  - increase ambulation   - ASA, statin, ARB  - To follow up with Dr. Nicholas on 5/18 with echo to include TAVR APRN assessment for KCCQ and 5 meter  - CT Surgery follow up 5/4/21  - Return to work per CT Surgery recommendations    2. Essential hypertension  - AM hypotension episodes  - reduced losartan to 50 mg QD and review BP control with Cardiology      15 minutes    Follow Up {Instructions Charge Capture  Follow-up Communications :23}   Return in about 1 year (around 4/19/2022) for TAVR one year.    Patient was given instructions and counseling regarding his condition or for health maintenance advice. Please see specific information pulled into the AVS if appropriate.  Patient was instructed to call the [facility] with any questions, concerns, or worsening symptoms.

## 2021-04-20 LAB — LV EF 2D ECHO EST: 60 %

## 2021-04-21 ENCOUNTER — DOCUMENTATION (OUTPATIENT)
Dept: CARDIAC REHAB | Facility: HOSPITAL | Age: 61
End: 2021-04-21

## 2021-04-21 DIAGNOSIS — Z95.2 S/P TAVR (TRANSCATHETER AORTIC VALVE REPLACEMENT): Primary | ICD-10-CM

## 2021-04-21 NOTE — PROGRESS NOTES
Due to the Covid 19 pandemic [facility] Cardiac [facility]  is open but we are social distancing that means space is limited  We will do our best to get  the patients that want to participate into the program scheduled. Patients are being notified of the the restrictions and  being placed on a waiting list at this time and if they are interested staff will schedule patients as soon as possible.        Patient is scheduled for Cardiac Rehab on 5/25/21 at 10 am

## 2021-04-26 ENCOUNTER — TELEPHONE (OUTPATIENT)
Dept: CARDIOLOGY | Facility: HOSPITAL | Age: 61
End: 2021-04-26

## 2021-04-26 NOTE — TELEPHONE ENCOUNTER
Mr. Thomason called with concerns about his heart rate.  He says resting HR 85-95 bpm.  He is concerned that HR becomes abnormally increases with position changes.  He is getting his HR from new Apple Watch.  Patient denies chest pain, dizziness, or dyspnea.    Advised Apple Watch can be inaccurate in detecting HR and rhythms.  Keep notes on his HR/ activity/ and his concerns.  Review with Dr. Nicholas @ Cardiology follow up later this week.      Shirley LONDONO

## 2021-04-27 ENCOUNTER — TELEPHONE (OUTPATIENT)
Dept: CARDIAC SURGERY | Facility: CLINIC | Age: 61
End: 2021-04-27

## 2021-05-03 ENCOUNTER — TELEPHONE (OUTPATIENT)
Dept: CARDIOLOGY | Facility: HOSPITAL | Age: 61
End: 2021-05-03

## 2021-05-03 NOTE — TELEPHONE ENCOUNTER
TAVR SPRING    Patient in town to get EKG for Dr. Nicholas.  Stopped by [facility] to collect post TAVR [id number] and walk test.  See scanned data sheets.      Other than irregular heart rate (which Dr. Nicholas is addressing) patient states he is breathing well and feels good.      Will obtain copy of Dr. Nicholas's echo scheduled for 5/18/21.  CT Surgery follow up tomorrow.    Shirley LONDONO

## 2021-05-04 ENCOUNTER — OFFICE VISIT (OUTPATIENT)
Dept: CARDIAC SURGERY | Facility: CLINIC | Age: 61
End: 2021-05-04

## 2021-05-04 VITALS
TEMPERATURE: 97.8 F | HEART RATE: 98 BPM | DIASTOLIC BLOOD PRESSURE: 90 MMHG | SYSTOLIC BLOOD PRESSURE: 130 MMHG | WEIGHT: 211 LBS | BODY MASS INDEX: 29.54 KG/M2 | OXYGEN SATURATION: 99 % | HEIGHT: 71 IN

## 2021-05-04 DIAGNOSIS — I35.0 AORTIC STENOSIS, SEVERE: Primary | ICD-10-CM

## 2021-05-04 PROBLEM — I10 ESSENTIAL HYPERTENSION: Status: ACTIVE | Noted: 2019-05-16

## 2021-05-04 PROBLEM — Z85.47 HISTORY OF MALIGNANT NEOPLASM OF TESTIS: Status: ACTIVE | Noted: 2021-05-04

## 2021-05-04 PROCEDURE — 71046 X-RAY EXAM CHEST 2 VIEWS: CPT | Performed by: NURSE PRACTITIONER

## 2021-05-04 PROCEDURE — 99213 OFFICE O/P EST LOW 20 MIN: CPT | Performed by: NURSE PRACTITIONER

## 2021-05-04 NOTE — PROGRESS NOTES
[facility] Cardiothoracic Surgery Office Follow Up Note     Date of Encounter: 05/04/2021     MRN Number: [id number]  Name: Fidel Roberson  Phone Number: [phone]     Referred By: No ref. provider found  PCP: Jonathan Fernandez MD    Chief Complaint:    Chief Complaint   Patient presents with   • Post-op Follow-up     Hosp D/C TAVR 4/12/21 for Aortic Valve Stenosis-Concerned about Heartrate       History of Present Illness:    Fidel Roberson is a 61 y.o. male former smoker with symptomatic severe aortic stenosis who is s/p TAVR on 4/12/2021 with Dr. Sandoval.  He was discharged the following day but contacted nurse navigator for complaints of severe right leg  pain.  He presented to [facility] ED for the pain and had negative DVT ultrasound.  He has had follow-up in heart valve clinic with Shirley LONDONO and had reduction in his losartan for morning hypotension. He has since called nurse navigator again for concerns regarding his heart rate being 85-95bpm, noted via new apple watch.  He went to Dr. Nicholas's office for EKG and Holter monitor but does not have results yet.  Patient is very energetic and ecstatic that he is doing so well in his appointment today.  He is active having no chest pain or shortness of air.  His leg pain has resolved, he has had no subsequent morning hypotension and has a BP log on his phone showing BP averaging 120s-130s/80s, and since not wearing his apple watch he has not noticed any issues with tachycardia.    Review of Systems:  Review of Systems   Constitutional: Positive for weight loss (few pounds by diet). Negative for chills, decreased appetite, diaphoresis, fever, malaise/fatigue, night sweats and weight gain.   HENT: Negative for hoarse voice.    Eyes: Negative for blurred vision, double vision and visual disturbance.   Cardiovascular: Positive for palpitations. Negative for chest pain, claudication, dyspnea on exertion, irregular heartbeat, leg swelling,  near-syncope, orthopnea, paroxysmal nocturnal dyspnea and syncope.   Respiratory: Negative for cough, hemoptysis, shortness of breath, sputum production and wheezing.    Hematologic/Lymphatic: Negative for adenopathy and bleeding problem. Does not bruise/bleed easily.   Skin: Negative for color change, nail changes, poor wound healing and rash.   Musculoskeletal: Negative for back pain, falls and muscle cramps.   Gastrointestinal: Negative for abdominal pain, dysphagia and heartburn.   Genitourinary: Negative for flank pain.   Neurological: Negative for brief paralysis, disturbances in coordination, dizziness, focal weakness, headaches, light-headedness, loss of balance, numbness, paresthesias, sensory change, vertigo and weakness.   Psychiatric/Behavioral: Negative for depression and suicidal ideas.   Allergic/Immunologic: Negative for persistent infections.       I have reviewed the following portions of the patient's history: allergies, current medications, past family history, past medical history, past social history, past surgical history and problem list and confirm it's accurate.    Allergies:  No Known Allergies    Medications:      Current Outpatient Medications:   •  aspirin 81 MG EC tablet, Take 162 mg by mouth Daily., Disp: , Rfl:   •  coenzyme Q10 100 MG capsule, Take 200 mg by mouth Daily., Disp: , Rfl:   •  losartan (COZAAR) 100 MG tablet, Take 100 mg by mouth Daily., Disp: , Rfl: 3  •  melatonin 5 MG tablet tablet, Take 5 mg by mouth Every Night., Disp: , Rfl:   •  Omega-3 1000 MG capsule, Take  by mouth Daily., Disp: , Rfl:   •  simvastatin (ZOCOR) 40 MG tablet, Take 40 mg by mouth Every Night., Disp: , Rfl:     History:   Past Medical History:   Diagnosis Date   • Aortic valve disease    • Cancer (CMS/HCC)     testicular   • Elevated hemoglobin A1c    • Heart murmur    • [place] (hard of hearing)     hearing aides   • Hyperlipidemia    • Hypertension    • Small bowel obstruction (CMS/HCC)    • Wears  "dentures     top   • Wears glasses        Past Surgical History:   Procedure Laterality Date   • AORTIC VALVE REPAIR/REPLACEMENT N/A 2021    Procedure: TRANSCATHETER AORTIC VALVE REPLACEMENT;  Surgeon: Fidel Sandoval MD;  Location: [facility] [place];  Service: Cardiothoracic;  Laterality: N/A;  90mls [id number]  8mins 30 secs  97mGy   • AORTIC VALVE REPAIR/REPLACEMENT N/A 2021    Procedure: TRANSCATHETER AORTIC VALVE INSERTION;  Surgeon: Arie Nicholas MD;  Location: [facility] [place];  Service: Cardiovascular;  Laterality: N/A;   • CARDIAC CATHETERIZATION N/A 3/26/2021    Procedure: Right and Left Heart Cath;  Surgeon: Emre Guzmán MD;  Location: [facility] CATH INVASIVE LOCATION;  Service: Cardiology;  Laterality: N/A;   • COLONOSCOPY     • SMALL INTESTINE SURGERY         • TESTICLE SURGERY     • TRANSESOPHAGEAL ECHOCARDIOGRAM (NIKKO) N/A 2021    Procedure: TRANSESOPHAGEAL ECHOCARDIOGRAM WITH ANESTHESIA;  Surgeon: Fidel Sandoval MD;  Location: [facility] [place];  Service: Cardiothoracic;  Laterality: N/A;        Family History   Problem Relation Age of Onset   • Diabetes Mother    • Heart disease Mother    • Heart disease Father    • Stroke Brother    • No Known Problems Brother        Social History     Socioeconomic History   • Marital status:      Spouse name: Deloris   • Number of children: 1   • Years of education: HS   • Highest education level: Not on file   Tobacco Use   • Smoking status: Former Smoker     Packs/day: 1.00     Years: 30.00     Pack years: 30.00     Types: Cigarettes     Quit date: 2010     Years since quittin.2   • Smokeless tobacco: Never Used   Substance and Sexual Activity   • Alcohol use: No   • Drug use: Never   • Sexual activity: Defer     Physical Exam:  Vitals:    21 1458   BP: 130/90   BP Location: Left arm   Patient Position: Sitting   Pulse: 98   Temp: 97.8 °F (36.6 °C)   SpO2: 99%   Weight: 95.7 kg (211 lb)   Height: 180.3 cm (71\")    "   Body mass index is 29.43 kg/m².    Physical Exam  Vitals and nursing note reviewed.   Constitutional:       General: He is awake.      Appearance: Normal appearance. He is well-developed.   HENT:      Head: Normocephalic and atraumatic.   Eyes:      Pupils: Pupils are equal, round, and reactive to light.   Neck:      Vascular: No carotid bruit.   Cardiovascular:      Rate and Rhythm: Normal rate and regular rhythm.      Pulses: Normal pulses.      Heart sounds: Normal heart sounds, S1 normal and S2 normal. No murmur heard.     Pulmonary:      Effort: Pulmonary effort is normal.      Breath sounds: Normal breath sounds.   Abdominal:      Palpations: Abdomen is soft.   Musculoskeletal:         General: Normal range of motion.      Cervical back: Neck supple.      Right lower leg: No edema.      Left lower leg: No edema.   Skin:     General: Skin is warm and dry.      Capillary Refill: Capillary refill takes less than 2 seconds.      Findings: No bruising.      Comments: Femoral puncture sites: well healing, no surrounding erythema, warmth, drainage, hematoma, or induration   Neurological:      General: No focal deficit present.      Mental Status: He is alert and oriented to person, place, and time. Mental status is at baseline.      GCS: GCS eye subscore is 4. GCS verbal subscore is 5. GCS motor subscore is 6.      Sensory: Sensation is intact.      Motor: Motor function is intact.      Coordination: Coordination is intact.      Gait: Gait is intact.   Psychiatric:         Mood and Affect: Mood is anxious and elated.         Speech: Speech is rapid and pressured.         Behavior: Behavior is hyperactive. Behavior is cooperative.         Cognition and Memory: Cognition normal.       Labs/Imaging:  Chest x-ray in office today: Lungs fully expanded and well-inflated. No signs of pneumothorax, pleural effusion, or acute airspace consolidation noted. Valve noted. Cardiomediastinal structures appear within normal  limits. Personally reviewed.  Official radiology report pending    US Venous Doppler Lower Extremity Bilateral (duplex)    Result Date: 4/13/2021  No DVT in the lower extremities on today's exam.  This report was finalized on 4/13/2021 8:52 PM by Dr. José Manuel Lane MD.       Assessment / Plan:  Diagnoses and all orders for this visit:    1. TAVR 04/12/21 (Primary)    Fidel Roberson is a 61 y.o. male former smoker with symptomatic severe aortic stenosis who is s/p TAVR on 4/12/2021 with Dr. Sandoval.  He is doing very well postoperatively and is ecstatic and elated during clinic follow-up today.  He is energetic and asymptomatic.  He has stable femoral site with no underlying hematoma as well as stable CXR in clinic today.  From a surgical standpoint he can follow-up with us on as-needed basis.  He has follow-up with Dr. Nicholas on 5/18 for postoperative echo as well as his heart monitor results, with anticipated continuity of care with Dr. Guzmán in [place].      Follow Up:   Return on an as needed basis.   RTC for any further concerns or worsening sign and symptoms. If unable to reach us in the office please dial 911 or go to the nearest emergency department.      Ashley LONDONO  [facility] Cardiothoracic Surgery

## 2021-05-12 ENCOUNTER — OFFICE VISIT (OUTPATIENT)
Dept: CARDIOLOGY | Facility: CLINIC | Age: 61
End: 2021-05-12

## 2021-05-12 VITALS
HEIGHT: 71 IN | OXYGEN SATURATION: 98 % | SYSTOLIC BLOOD PRESSURE: 144 MMHG | TEMPERATURE: 97.5 F | DIASTOLIC BLOOD PRESSURE: 83 MMHG | HEART RATE: 96 BPM | BODY MASS INDEX: 30.3 KG/M2 | WEIGHT: 216.4 LBS

## 2021-05-12 DIAGNOSIS — I35.9 AVD (AORTIC VALVE DISEASE): Primary | ICD-10-CM

## 2021-05-12 DIAGNOSIS — I25.10 ASCVD (ARTERIOSCLEROTIC CARDIOVASCULAR DISEASE): ICD-10-CM

## 2021-05-12 DIAGNOSIS — I10 ESSENTIAL HYPERTENSION: ICD-10-CM

## 2021-05-12 DIAGNOSIS — E78.5 DYSLIPIDEMIA: ICD-10-CM

## 2021-05-12 PROCEDURE — 99213 OFFICE O/P EST LOW 20 MIN: CPT | Performed by: NURSE PRACTITIONER

## 2021-05-12 NOTE — PROGRESS NOTES
Jonathan Fernandez MD  Fidel Roberson  1960 05/12/2021    Patient Active Problem List   Diagnosis   • Nonrheumatic aortic valve stenosis   • TAVR 04/12/21   • Essential hypertension   • Mixed hyperlipidemia   • Elevated hemoglobin A1c   • History of malignant neoplasm of testis       Dear Jonathan Fernandez MD:    Subjective     Chief Complaint   Patient presents with   • Follow-up   • Med Management     verbal           History of Present Illness:    Fidel Roberson is a 61 y.o. male with a past medical history of severe aortic valve stenosis for which he underwent TAVR on 4/12/2021.  He presents today for routine cardiology follow-up.  He reports he is doing exceptionally well.  He has had follow-up with CT surgery recently with unremarkable chest x-ray.  He has an appointment with Dr. Nicholas next week for repeat echocardiogram and to discuss recent cardiac event monitor results which are not available for my review.  The patient states he is doing very well.  He denies any chest pain, shortness of breath, palpitations, dizziness, or lightheadedness.  Denies any leg edema.  He is tolerating activity very well.          No Known Allergies:      Current Outpatient Medications:   •  aspirin 81 MG EC tablet, Take 162 mg by mouth Daily., Disp: , Rfl:   •  coenzyme Q10 100 MG capsule, Take 200 mg by mouth Daily., Disp: , Rfl:   •  losartan (COZAAR) 100 MG tablet, Take 100 mg by mouth Daily., Disp: , Rfl: 3  •  melatonin 5 MG tablet tablet, Take 5 mg by mouth Every Night., Disp: , Rfl:   •  Omega-3 1000 MG capsule, Take  by mouth Daily., Disp: , Rfl:   •  simvastatin (ZOCOR) 40 MG tablet, Take 40 mg by mouth Every Night., Disp: , Rfl:       The following portions of the patient's history were reviewed and updated as appropriate: allergies, current medications, past family history, past medical history, past social history, past surgical history and problem list.    Social History     Tobacco Use   • Smoking status:  "Former Smoker     Packs/day: 1.00     Years: 30.00     Pack years: 30.00     Types: Cigarettes     Quit date: 2010     Years since quittin.2   • Smokeless tobacco: Never Used   Substance Use Topics   • Alcohol use: No   • Drug use: Never       Review of Systems   Constitutional: Negative for decreased appetite and malaise/fatigue.   Cardiovascular: Negative for chest pain, dyspnea on exertion, irregular heartbeat, leg swelling, near-syncope, orthopnea, palpitations, paroxysmal nocturnal dyspnea and syncope.   Respiratory: Negative for cough, shortness of breath and wheezing.    Neurological: Negative for dizziness, light-headedness and weakness.       Objective   Vitals:    21 1515   BP: 144/83   BP Location: Left arm   Patient Position: Sitting   Cuff Size: Adult   Pulse: 96   Temp: 97.5 °F (36.4 °C)   TempSrc: Infrared   SpO2: 98%   Weight: 98.2 kg (216 lb 6.4 oz)   Height: 180.3 cm (71\")     Body mass index is 30.18 kg/m².        Vitals reviewed.   Constitutional:       Appearance: Healthy appearance. Well-developed and not in distress.   HENT:      Head: Normocephalic and atraumatic.   Pulmonary:      Effort: Pulmonary effort is normal.      Breath sounds: Normal breath sounds. No wheezing. No rales.   Cardiovascular:      Normal rate. Regular rhythm.      Murmurs: There is no murmur.      . No S3 and S4 gallop.   Edema:     Peripheral edema absent.   Abdominal:      General: Bowel sounds are normal.      Palpations: Abdomen is soft.   Skin:     General: Skin is warm and dry.   Neurological:      Mental Status: Alert, oriented to person, place, and time and oriented to person, place and time.   Psychiatric:         Mood and Affect: Mood normal.         Behavior: Behavior normal.         Lab Results   Component Value Date     2021    K 3.9 2021     2021    CO2 23.1 2021    BUN 20 2021    CREATININE 0.94 2021    GLUCOSE 105 (H) 2021    CALCIUM " 9.0 04/13/2021    AST 18 04/13/2021    ALT 20 04/13/2021    ALKPHOS 78 04/13/2021     No results found for: CKTOTAL  Lab Results   Component Value Date    WBC 14.54 (H) 04/13/2021    HGB 13.5 04/13/2021    HCT 42.7 04/13/2021     04/13/2021     Lab Results   Component Value Date    INR 1.01 04/09/2021     Lab Results   Component Value Date    MG 1.9 04/09/2021     No results found for: TSH, PSA, CHLPL, TRIG, HDL, LDL   No results found for: BNP        Procedures      Assessment/Plan    Diagnosis Plan   1. AVD (aortic valve disease)     2. Essential hypertension     3. ASCVD (arteriosclerotic cardiovascular disease) (mild)     4. Dyslipidemia                  Recommendations:    1. ASCVD-minimal, on cardiac catheterization.  Continue low-dose aspirin, losartan, and simvastatin.  2. Aortic valve disease status post TAVR on 4/12/2021-we will follow up with Dr. Nicholas to discuss recent echo and event monitor results.  3. Essential hypertension-mildly elevated today but has been controlled.  He will continue to monitor.  4. Dyslipidemia-on statin therapy.  5. Follow-up in 3 months or sooner if needed.        Return in about 3 months (around 8/12/2021) for Recheck.    As always, I appreciate very much the opportunity to participate in the cardiovascular care of your patients.      With Best Regards,    SPRING Juan

## 2021-05-20 ENCOUNTER — TELEPHONE (OUTPATIENT)
Dept: CARDIAC SURGERY | Facility: CLINIC | Age: 61
End: 2021-05-20

## 2021-05-24 ENCOUNTER — DOCUMENTATION (OUTPATIENT)
Dept: CARDIAC REHAB | Facility: HOSPITAL | Age: 61
End: 2021-05-24

## 2021-05-24 NOTE — PROGRESS NOTES
Called to verify appointment for 5/25/21 for cardiac rehab and patient stated he has returned to work and will not be attending. Stated he works from 7 am to 5 pm

## 2021-05-25 ENCOUNTER — APPOINTMENT (OUTPATIENT)
Dept: CARDIAC REHAB | Facility: HOSPITAL | Age: 61
End: 2021-05-25

## 2021-07-09 ENCOUNTER — TELEPHONE (OUTPATIENT)
Dept: CARDIOLOGY | Facility: CLINIC | Age: 61
End: 2021-07-09

## 2021-07-14 ENCOUNTER — TELEPHONE (OUTPATIENT)
Dept: CARDIOLOGY | Facility: CLINIC | Age: 61
End: 2021-07-14

## 2021-07-15 NOTE — TELEPHONE ENCOUNTER
Yes, but I need specific information in order to accurately complete the forms. It would probably be best to schedule an appointment so we can complete these appropriately. Thanks.

## 2021-07-19 NOTE — TELEPHONE ENCOUNTER
I called and spoke to pt and he states the paper work he is needing filled out is his combined insurance claim paperwork which helps him get paid when missing work but he states he has an appt with Jesse on 9/13/21 and he will just discuss this with him at the appointment. (Paperwork is scannedd into media in pts chart.)

## 2021-07-25 ENCOUNTER — HOSPITAL ENCOUNTER (EMERGENCY)
Facility: HOSPITAL | Age: 61
Discharge: HOME | End: 2021-07-25
Attending: EMERGENCY MEDICINE | Admitting: EMERGENCY MEDICINE

## 2021-07-25 VITALS
HEIGHT: 72 IN | BODY MASS INDEX: 28.85 KG/M2 | TEMPERATURE: 98.2 F | OXYGEN SATURATION: 100 % | HEART RATE: 79 BPM | SYSTOLIC BLOOD PRESSURE: 157 MMHG | DIASTOLIC BLOOD PRESSURE: 104 MMHG | RESPIRATION RATE: 18 BRPM | WEIGHT: 213 LBS

## 2021-07-25 DIAGNOSIS — I10 ASYMPTOMATIC HYPERTENSION: Primary | ICD-10-CM

## 2021-07-25 PROCEDURE — 99283 EMERGENCY DEPT VISIT LOW MDM: CPT

## 2021-07-25 RX ORDER — CLONIDINE HYDROCHLORIDE 0.1 MG/1
0.2 TABLET ORAL ONCE
Status: COMPLETED | OUTPATIENT
Start: 2021-07-25 | End: 2021-07-25

## 2021-07-25 RX ADMIN — CLONIDINE HYDROCHLORIDE 0.2 MG: 0.1 TABLET ORAL at 11:14

## 2021-07-25 NOTE — ED PROVIDER NOTES
Subjective   Patient presents to the emergency department complaining of hypertension at home.  He does not have any symptoms but checks his blood pressure this morning and noted that it has been high.  His peak blood pressure today was 208/125 at 10:09 AM.  He took 100 mg of losartan prior to arrival.      History provided by:  Patient and spouse      Review of Systems   All other systems reviewed and are negative.      Past Medical History:   Diagnosis Date   • Aortic valve disease    • Cancer (CMS/HCC)     testicular   • Elevated hemoglobin A1c    • Heart murmur    • [place] (hard of hearing)     hearing aides   • Hyperlipidemia    • Hypertension    • Small bowel obstruction (CMS/HCC)    • Wears dentures     top   • Wears glasses        No Known Allergies    Past Surgical History:   Procedure Laterality Date   • AORTIC VALVE REPAIR/REPLACEMENT N/A 4/12/2021    Procedure: TRANSCATHETER AORTIC VALVE REPLACEMENT;  Surgeon: Fidel Sandoval MD;  Location: [facility];  Service: Cardiothoracic;  Laterality: N/A;  90mls [id number]  8mins 30 secs  97mGy   • AORTIC VALVE REPAIR/REPLACEMENT N/A 4/12/2021    Procedure: TRANSCATHETER AORTIC VALVE INSERTION;  Surgeon: Arie Nicholas MD;  Location: [facility];  Service: Cardiovascular;  Laterality: N/A;   • CARDIAC CATHETERIZATION N/A 3/26/2021    Procedure: Right and Left Heart Cath;  Surgeon: Emre Guzmán MD;  Location: [facility] CATH INVASIVE LOCATION;  Service: Cardiology;  Laterality: N/A;   • COLONOSCOPY     • SMALL INTESTINE SURGERY      2005   • TESTICLE SURGERY     • TRANSESOPHAGEAL ECHOCARDIOGRAM (NIKKO) N/A 4/12/2021    Procedure: TRANSESOPHAGEAL ECHOCARDIOGRAM WITH ANESTHESIA;  Surgeon: Fidel Sandoval MD;  Location:  [facility];  Service: Cardiothoracic;  Laterality: N/A;       Family History   Problem Relation Age of Onset   • Diabetes Mother    • Heart disease Mother    • Heart disease Father    • Stroke Brother    • No Known Problems Brother         Social History     Socioeconomic History   • Marital status:      Spouse name: Deloris   • Number of children: 1   • Years of education: HS   • Highest education level: Not on file   Tobacco Use   • Smoking status: Former Smoker     Packs/day: 1.00     Years: 30.00     Pack years: 30.00     Types: Cigarettes     Quit date: 2010     Years since quittin.4   • Smokeless tobacco: Never Used   Substance and Sexual Activity   • Alcohol use: No   • Drug use: Never   • Sexual activity: Defer           Objective   Physical Exam  Vitals and nursing note reviewed.   Constitutional:       Appearance: Normal appearance. He is well-developed.   HENT:      Head: Normocephalic and atraumatic.      Mouth/Throat:      Mouth: Mucous membranes are moist.      Pharynx: Oropharynx is clear.   Eyes:      Extraocular Movements: Extraocular movements intact.      Pupils: Pupils are equal, round, and reactive to light.   Cardiovascular:      Rate and Rhythm: Normal rate and regular rhythm.      Pulses: Normal pulses.   Pulmonary:      Effort: Pulmonary effort is normal. No respiratory distress.      Breath sounds: Normal breath sounds.   Abdominal:      Palpations: Abdomen is soft.      Tenderness: There is no abdominal tenderness. There is no rebound.   Musculoskeletal:         General: No swelling, tenderness or deformity.      Cervical back: Normal range of motion and neck supple. No tenderness.   Skin:     General: Skin is warm and dry.      Capillary Refill: Capillary refill takes less than 2 seconds.   Neurological:      General: No focal deficit present.      Mental Status: He is alert and oriented to person, place, and time.      Cranial Nerves: No cranial nerve deficit.      Motor: No weakness.   Psychiatric:         Mood and Affect: Mood normal.         Behavior: Behavior normal.         Thought Content: Thought content normal.         Procedures           ED Course  ED Course as of  122   Sun 2021    1219 Patient with asymptomatic hypertension.  After receiving clonidine here in the emergency department his blood pressure is 157/104.  He feels well and will be discharged to follow-up with his primary care physician.  He is well-appearing and comfortable at time of discharge and agreeable with plan of care.  EKG, imaging, and labs are not indicated in this patient with asymptomatic hypertension.    [JG]      ED Course User Index  [JG] Cecil Conner DO                                           [facility]      Final diagnoses:   Asymptomatic hypertension       ED Disposition  ED Disposition     ED Disposition Condition Comment    Discharge Stable           Jonathan Fernandez MD  [address] [facility] [address]  [phone]    Schedule an appointment as soon as possible for a visit in 1 day           Medication List      No changes were made to your prescriptions during this visit.          Cecil Conner DO  07/25/21 1039

## 2021-08-17 ENCOUNTER — APPOINTMENT (OUTPATIENT)
Dept: GENERAL RADIOLOGY | Facility: HOSPITAL | Age: 61
End: 2021-08-17

## 2021-08-17 ENCOUNTER — HOSPITAL ENCOUNTER (EMERGENCY)
Facility: HOSPITAL | Age: 61
Discharge: HOME | End: 2021-08-17
Attending: EMERGENCY MEDICINE | Admitting: EMERGENCY MEDICINE

## 2021-08-17 VITALS
TEMPERATURE: 98.4 F | HEIGHT: 71 IN | BODY MASS INDEX: 29.68 KG/M2 | HEART RATE: 79 BPM | OXYGEN SATURATION: 99 % | RESPIRATION RATE: 16 BRPM | SYSTOLIC BLOOD PRESSURE: 156 MMHG | DIASTOLIC BLOOD PRESSURE: 92 MMHG | WEIGHT: 212 LBS

## 2021-08-17 DIAGNOSIS — I10 ESSENTIAL HYPERTENSION: Primary | ICD-10-CM

## 2021-08-17 LAB
ALBUMIN SERPL-MCNC: 4.56 G/DL (ref 3.5–5.2)
ALBUMIN/GLOB SERPL: 1.3 G/DL
ALP SERPL-CCNC: 85 U/L (ref 39–117)
ALT SERPL W P-5'-P-CCNC: 27 U/L (ref 1–41)
ANION GAP SERPL CALCULATED.3IONS-SCNC: 11.2 MMOL/L (ref 5–15)
AST SERPL-CCNC: 27 U/L (ref 1–40)
BASOPHILS # BLD AUTO: 0.06 10*3/MM3 (ref 0–0.2)
BASOPHILS NFR BLD AUTO: 0.7 % (ref 0–1.5)
BILIRUB SERPL-MCNC: 0.2 MG/DL (ref 0–1.2)
BILIRUB UR QL STRIP: NEGATIVE
BUN SERPL-MCNC: 28 MG/DL (ref 8–23)
BUN/CREAT SERPL: 24.1 (ref 7–25)
CALCIUM SPEC-SCNC: 9.9 MG/DL (ref 8.6–10.5)
CHLORIDE SERPL-SCNC: 102 MMOL/L (ref 98–107)
CLARITY UR: CLEAR
CO2 SERPL-SCNC: 24.8 MMOL/L (ref 22–29)
COLOR UR: YELLOW
CREAT SERPL-MCNC: 1.16 MG/DL (ref 0.76–1.27)
DEPRECATED RDW RBC AUTO: 46.2 FL (ref 37–54)
EOSINOPHIL # BLD AUTO: 0.17 10*3/MM3 (ref 0–0.4)
EOSINOPHIL NFR BLD AUTO: 1.9 % (ref 0.3–6.2)
ERYTHROCYTE [DISTWIDTH] IN BLOOD BY AUTOMATED COUNT: 14.5 % (ref 12.3–15.4)
GFR SERPL CREATININE-BSD FRML MDRD: 64 ML/MIN/1.73
GLOBULIN UR ELPH-MCNC: 3.5 GM/DL
GLUCOSE SERPL-MCNC: 89 MG/DL (ref 65–99)
GLUCOSE UR STRIP-MCNC: NEGATIVE MG/DL
HCT VFR BLD AUTO: 49.8 % (ref 37.5–51)
HGB BLD-MCNC: 15.5 G/DL (ref 13–17.7)
HGB UR QL STRIP.AUTO: NEGATIVE
HOLD SPECIMEN: NORMAL
HOLD SPECIMEN: NORMAL
IMM GRANULOCYTES # BLD AUTO: 0.03 10*3/MM3 (ref 0–0.05)
IMM GRANULOCYTES NFR BLD AUTO: 0.3 % (ref 0–0.5)
KETONES UR QL STRIP: NEGATIVE
LEUKOCYTE ESTERASE UR QL STRIP.AUTO: NEGATIVE
LYMPHOCYTES # BLD AUTO: 3.99 10*3/MM3 (ref 0.7–3.1)
LYMPHOCYTES NFR BLD AUTO: 45.3 % (ref 19.6–45.3)
MCH RBC QN AUTO: 27.3 PG (ref 26.6–33)
MCHC RBC AUTO-ENTMCNC: 31.1 G/DL (ref 31.5–35.7)
MCV RBC AUTO: 87.7 FL (ref 79–97)
MONOCYTES # BLD AUTO: 0.87 10*3/MM3 (ref 0.1–0.9)
MONOCYTES NFR BLD AUTO: 9.9 % (ref 5–12)
NEUTROPHILS NFR BLD AUTO: 3.68 10*3/MM3 (ref 1.7–7)
NEUTROPHILS NFR BLD AUTO: 41.9 % (ref 42.7–76)
NITRITE UR QL STRIP: NEGATIVE
NRBC BLD AUTO-RTO: 0 /100 WBC (ref 0–0.2)
NT-PROBNP SERPL-MCNC: 17.4 PG/ML (ref 0–900)
PH UR STRIP.AUTO: 7 [PH] (ref 5–8)
PLATELET # BLD AUTO: 201 10*3/MM3 (ref 140–450)
PMV BLD AUTO: 10.2 FL (ref 6–12)
POTASSIUM SERPL-SCNC: 4.4 MMOL/L (ref 3.5–5.2)
PROT SERPL-MCNC: 8.1 G/DL (ref 6–8.5)
PROT UR QL STRIP: NEGATIVE
RBC # BLD AUTO: 5.68 10*6/MM3 (ref 4.14–5.8)
SODIUM SERPL-SCNC: 138 MMOL/L (ref 136–145)
SP GR UR STRIP: 1.01 (ref 1–1.03)
TROPONIN T SERPL-MCNC: <0.01 NG/ML (ref 0–0.03)
UROBILINOGEN UR QL STRIP: NORMAL
WBC # BLD AUTO: 8.8 10*3/MM3 (ref 3.4–10.8)
WHOLE BLOOD HOLD SPECIMEN: NORMAL

## 2021-08-17 PROCEDURE — 93010 ELECTROCARDIOGRAM REPORT: CPT | Performed by: INTERNAL MEDICINE

## 2021-08-17 PROCEDURE — 25010000002 HYDRALAZINE PER 20 MG: Performed by: PHYSICIAN ASSISTANT

## 2021-08-17 PROCEDURE — 96376 TX/PRO/DX INJ SAME DRUG ADON: CPT

## 2021-08-17 PROCEDURE — 80053 COMPREHEN METABOLIC PANEL: CPT | Performed by: PHYSICIAN ASSISTANT

## 2021-08-17 PROCEDURE — 85025 COMPLETE CBC W/AUTO DIFF WBC: CPT | Performed by: PHYSICIAN ASSISTANT

## 2021-08-17 PROCEDURE — 96374 THER/PROPH/DIAG INJ IV PUSH: CPT

## 2021-08-17 PROCEDURE — 71045 X-RAY EXAM CHEST 1 VIEW: CPT

## 2021-08-17 PROCEDURE — 93005 ELECTROCARDIOGRAM TRACING: CPT | Performed by: PHYSICIAN ASSISTANT

## 2021-08-17 PROCEDURE — 84484 ASSAY OF TROPONIN QUANT: CPT | Performed by: PHYSICIAN ASSISTANT

## 2021-08-17 PROCEDURE — 99283 EMERGENCY DEPT VISIT LOW MDM: CPT

## 2021-08-17 PROCEDURE — 83880 ASSAY OF NATRIURETIC PEPTIDE: CPT | Performed by: PHYSICIAN ASSISTANT

## 2021-08-17 PROCEDURE — 81003 URINALYSIS AUTO W/O SCOPE: CPT | Performed by: PHYSICIAN ASSISTANT

## 2021-08-17 RX ORDER — HYDRALAZINE HYDROCHLORIDE 20 MG/ML
10 INJECTION INTRAMUSCULAR; INTRAVENOUS ONCE
Status: COMPLETED | OUTPATIENT
Start: 2021-08-17 | End: 2021-08-17

## 2021-08-17 RX ORDER — AMLODIPINE BESYLATE 5 MG/1
5 TABLET ORAL DAILY
Qty: 14 TABLET | Refills: 0 | Status: SHIPPED | OUTPATIENT
Start: 2021-08-17

## 2021-08-17 RX ORDER — SODIUM CHLORIDE 0.9 % (FLUSH) 0.9 %
10 SYRINGE (ML) INJECTION AS NEEDED
Status: DISCONTINUED | OUTPATIENT
Start: 2021-08-17 | End: 2021-08-18 | Stop reason: HOSPADM

## 2021-08-17 RX ADMIN — HYDRALAZINE HYDROCHLORIDE 10 MG: 20 INJECTION INTRAMUSCULAR; INTRAVENOUS at 22:50

## 2021-08-17 RX ADMIN — HYDRALAZINE HYDROCHLORIDE 10 MG: 20 INJECTION INTRAMUSCULAR; INTRAVENOUS at 21:38

## 2021-08-18 NOTE — ED PROVIDER NOTES
Subjective   62 yo male patient presents to the ED with complaints of HTN. Patient denies any CP or SOB. Patient states that he has been having trouble keeping BP controlled and his PCP increased his losartan from 50 to 100 a couple of days ago. He states that he is aysmptomatic.  He denies any worsening or alleviating factors.       History provided by:  Patient   used: No        Review of Systems   Constitutional: Negative.    HENT: Negative.    Eyes: Negative.    Respiratory: Negative.    Cardiovascular: Negative.    Gastrointestinal: Negative.    Endocrine: Negative.    Genitourinary: Negative.    Musculoskeletal: Negative.    Skin: Negative.    Allergic/Immunologic: Negative.    Neurological: Negative.    Hematological: Negative.    Psychiatric/Behavioral: Negative.    All other systems reviewed and are negative.      Past Medical History:   Diagnosis Date   • Aortic valve disease    • Cancer (CMS/HCC)     testicular   • Elevated hemoglobin A1c    • Heart murmur    • [place] (hard of hearing)     hearing aides   • Hyperlipidemia    • Hypertension    • Small bowel obstruction (CMS/HCC)    • Wears dentures     top   • Wears glasses        No Known Allergies    Past Surgical History:   Procedure Laterality Date   • AORTIC VALVE REPAIR/REPLACEMENT N/A 4/12/2021    Procedure: TRANSCATHETER AORTIC VALVE REPLACEMENT;  Surgeon: Fidel Snadoval MD;  Location: [facility];  Service: Cardiothoracic;  Laterality: N/A;  90mls [id number]  8mins 30 secs  97mGy   • AORTIC VALVE REPAIR/REPLACEMENT N/A 4/12/2021    Procedure: TRANSCATHETER AORTIC VALVE INSERTION;  Surgeon: Arie Nicholas MD;  Location: [facility];  Service: Cardiovascular;  Laterality: N/A;   • CARDIAC CATHETERIZATION N/A 3/26/2021    Procedure: Right and Left Heart Cath;  Surgeon: Emre Guzmán MD;  [facility] CATH [facility]VASIVE LOC[facility];  Service: Cardiology;  Laterality: N/A;   • COLONOSCOPY     • SMALL INTESTINE SURGERY          • TESTICLE SURGERY     • TRANSESOPHAGEAL ECHOCARDIOGRAM (NIKKO) N/A 2021    Procedure: TRANSESOPHAGEAL ECHOCARDIOGRAM WITH ANESTHESIA;  Surgeon: Fidel Sandoval MD;  Location: [facility];  Service: Cardiothoracic;  Laterality: N/A;       Family History   Problem Relation Age of Onset   • Diabetes Mother    • Heart disease Mother    • Heart disease Father    • Stroke Brother    • No Known Problems Brother        Social History     Socioeconomic History   • Marital status:      Spouse name: Deloris   • Number of children: 1   • Years of education: HS   • Highest education level: Not on file   Tobacco Use   • Smoking status: Former Smoker     Packs/day: 1.00     Years: 30.00     Pack years: 30.00     Types: Cigarettes     Quit date: 2010     Years since quittin.5   • Smokeless tobacco: Never Used   Substance and Sexual Activity   • Alcohol use: No   • Drug use: Never   • Sexual activity: Defer           Objective   Physical Exam  Vitals and nursing note reviewed.   Constitutional:       Appearance: Normal appearance. He is normal weight.   HENT:      Head: Normocephalic and atraumatic.      Right Ear: External ear normal.      Left Ear: External ear normal.      Nose: Nose normal.      Mouth/Throat:      Mouth: Mucous membranes are moist.      Pharynx: Oropharynx is clear.   Eyes:      Extraocular Movements: Extraocular movements intact.      Conjunctiva/sclera: Conjunctivae normal.      Pupils: Pupils are equal, round, and reactive to light.   Cardiovascular:      Rate and Rhythm: Normal rate and regular rhythm.      Pulses: Normal pulses.      Heart sounds: Normal heart sounds.   Pulmonary:      Effort: Pulmonary effort is normal.      Breath sounds: Normal breath sounds.   Abdominal:      General: Abdomen is flat. Bowel sounds are normal.      Palpations: Abdomen is soft.   Musculoskeletal:         General: Normal range of motion.      Cervical back: Normal range of motion and neck  supple.   Skin:     General: Skin is warm and dry.      Capillary Refill: Capillary refill takes less than 2 seconds.   Neurological:      General: No focal deficit present.      Mental Status: He is alert and oriented to person, place, and time. Mental status is at baseline.   Psychiatric:         Mood and Affect: Mood normal.         Behavior: Behavior normal.         Thought Content: Thought content normal.         Judgment: Judgment normal.         Procedures           ED Course  ED Course as of Aug 17 2347   Tue Aug 17, 2021   2158 Sign out to Akira Buchanan     [ML]   2231 ECG 20:54 NSR, rate 71. RBBB, left ant. Fascicular block. QT/QTc 412/447.    [INGA]   2339 Patient blood pressure has responded appropriately.  Cardiac enzymes are negative.  Will start patient on amlodipine 5 mg in addition to his losartan and encourage close follow-up with primary care provider.    [RB]      ED Course User Index  [INGA] Arthur Owens MD  [ML] Jaja Macias PA  [RB] Akira Buchanan II, PA                                           MDM  Number of Diagnoses or Management Options  Essential hypertension: new and requires workup     Amount and/or Complexity of Data Reviewed  Clinical lab tests: ordered and reviewed  Tests in the radiology section of CPT®: ordered and reviewed  Discuss the patient with other providers: yes    Risk of Complications, Morbidity, and/or Mortality  Presenting problems: moderate  Diagnostic procedures: moderate  Management options: low    Patient Progress  Patient progress: stable      Final diagnoses:   Essential hypertension       ED Disposition  ED Disposition     ED Disposition Condition Comment    Discharge Stable           Jonathan Fernandez MD  [address] [facility] [address] [phone]    Go to            Medication List      New Prescriptions    amLODIPine 5 MG tablet  Commonly known as: NORVASC  Take 1 tablet by mouth Daily.           Where to Get Your Medications      You  can get these medications from any pharmacy    Bring a paper prescription for each of these medications  · amLODIPine 5 MG tablet          Akira Buchanan II, PA  08/17/21 4485

## 2021-08-18 NOTE — ED NOTES
Attempted to start IV x2 with no success. Another RN will go attempt to start IV.      Iraida Garrett, RN  08/17/21 8377

## 2021-08-19 LAB
QT INTERVAL: 412 MS
QTC INTERVAL: 447 MS

## 2021-09-13 ENCOUNTER — OFFICE VISIT (OUTPATIENT)
Dept: CARDIOLOGY | Facility: CLINIC | Age: 61
End: 2021-09-13

## 2021-09-13 VITALS
SYSTOLIC BLOOD PRESSURE: 118 MMHG | BODY MASS INDEX: 29.34 KG/M2 | WEIGHT: 209.6 LBS | TEMPERATURE: 97.3 F | HEART RATE: 78 BPM | DIASTOLIC BLOOD PRESSURE: 70 MMHG | HEIGHT: 71 IN

## 2021-09-13 DIAGNOSIS — E78.2 MIXED HYPERLIPIDEMIA: ICD-10-CM

## 2021-09-13 DIAGNOSIS — I35.0 AORTIC STENOSIS, SEVERE: Primary | ICD-10-CM

## 2021-09-13 DIAGNOSIS — I10 ESSENTIAL HYPERTENSION: ICD-10-CM

## 2021-09-13 PROCEDURE — 99213 OFFICE O/P EST LOW 20 MIN: CPT | Performed by: PHYSICIAN ASSISTANT

## 2021-09-13 RX ORDER — AMLODIPINE BESYLATE 2.5 MG/1
2.5 TABLET ORAL DAILY
COMMUNITY
Start: 2021-08-20 | End: 2022-03-31

## 2021-09-13 RX ORDER — LOSARTAN POTASSIUM 50 MG/1
50 TABLET ORAL DAILY
Qty: 90 TABLET | Refills: 3 | Status: SHIPPED | OUTPATIENT
Start: 2021-09-13

## 2021-09-13 NOTE — PROGRESS NOTES
Jonathan Fernandez MD  Fidel Roberson  1960 09/13/2021    Patient Active Problem List   Diagnosis   • Nonrheumatic aortic valve stenosis   • TAVR 04/12/21   • Essential hypertension   • Mixed hyperlipidemia   • Elevated hemoglobin A1c   • History of malignant neoplasm of testis       Dear Jonathan Fernandez MD:    Subjective     History of Present Illness:    Chief Complaint   Patient presents with   • Follow-up     pts states he has been to the hospital 2x since last visit in office due to high blood pressure       Fidel Roberson is a pleasant 61 y.o. male with a past medical history significant for aortic stenosis status post TAVR on 4/12/2021, mild nonobstructive CAD, essential hypertension, dyslipidemia.  He comes in today for routine cardiology follow-up.    Fidel has been having issues with his blood pressure the last couple months. However, since his most recent ER visit on 8/12 he states that he has made significant lifestyle changes. He has nearly eleminated all processed foods with high salt content and is eating more fresh foods. His blood pressure has since been much better controlled. He did bring a very detailed blood pressure log where he has been checking his blood pressure multiple times a day, sometimes up to 6 times or more. His 90 blood pressure average has been 145/86, however, the last 2-3 weeks his blood pressure average has been closeer to 120-130 mmHg systolic. He is currently taking amldoipine 2.5 mg daily and losartan 50 mg daily.       No Known Allergies:      Current Outpatient Medications:   •  amLODIPine (NORVASC) 2.5 MG tablet, Take 2.5 mg by mouth Daily., Disp: , Rfl:   •  aspirin 81 MG EC tablet, Take 162 mg by mouth Daily., Disp: , Rfl:   •  coenzyme Q10 100 MG capsule, Take 200 mg by mouth Daily., Disp: , Rfl:   •  losartan (COZAAR) 50 MG tablet, Take 1 tablet by mouth Daily., Disp: 90 tablet, Rfl: 3  •  melatonin 5 MG tablet tablet, Take 5 mg by mouth Every Night., Disp: , Rfl:  "  •  Omega-3 1000 MG capsule, Take  by mouth Daily., Disp: , Rfl:   •  simvastatin (ZOCOR) 40 MG tablet, Take 40 mg by mouth Every Night., Disp: , Rfl:   •  amLODIPine (NORVASC) 5 MG tablet, Take 1 tablet by mouth Daily., Disp: 14 tablet, Rfl: 0    The following portions of the patient's history were reviewed and updated as appropriate: allergies, current medications, past family history, past medical history, past social history, past surgical history and problem list.    Social History     Tobacco Use   • Smoking status: Former Smoker     Packs/day: 1.00     Years: 30.00     Pack years: 30.00     Types: Cigarettes     Quit date: 2010     Years since quittin.6   • Smokeless tobacco: Never Used   Substance Use Topics   • Alcohol use: No   • Drug use: Never         Objective   Vitals:    21 1419   BP: 118/70   Pulse: 78   Temp: 97.3 °F (36.3 °C)   Weight: 95.1 kg (209 lb 9.6 oz)   Height: 180.3 cm (71\")     Body mass index is 29.23 kg/m².    Constitutional:       General: Not in acute distress.     Appearance: Healthy appearance. Well-developed and not in distress. Not diaphoretic.   Eyes:      Conjunctiva/sclera: Conjunctivae normal.      Pupils: Pupils are equal, round, and reactive to light.   HENT:      Head: Normocephalic and atraumatic.   Neck:      Vascular: No carotid bruit or JVD.   Pulmonary:      Effort: Pulmonary effort is normal. No respiratory distress.      Breath sounds: Normal breath sounds.   Cardiovascular:      Normal rate. Regular rhythm.   Skin:     General: Skin is cool.   Neurological:      Mental Status: Alert, oriented to person, place, and time and oriented to person, place and time.         Lab Results   Component Value Date     2021    K 4.4 2021     2021    CO2 24.8 2021    BUN 28 (H) 2021    CREATININE 1.16 2021    GLUCOSE 89 2021    CALCIUM 9.9 2021    AST 27 2021    ALT 27 2021    ALKPHOS 85 " 08/17/2021     No results found for: CKTOTAL  Lab Results   Component Value Date    WBC 8.80 08/17/2021    HGB 15.5 08/17/2021    HCT 49.8 08/17/2021     08/17/2021     Lab Results   Component Value Date    INR 1.01 04/09/2021     Lab Results   Component Value Date    MG 1.9 04/09/2021     No results found for: TSH, PSA, CHLPL, TRIG, HDL, LDL   No results found for: BNP    During this visit the following were done:  Labs Reviewed []    Labs Ordered []    Radiology Reports Reviewed []    Radiology Ordered []    PCP Records Reviewed []    Referring Provider Records Reviewed []    ER Records Reviewed []    Hospital Records Reviewed []    History Obtained From Family []    Radiology Images Reviewed []    Other Reviewed []    Records Requested []       Procedures    Assessment/Plan    Diagnosis Plan   1. TAVR 04/12/21     2. Mixed hyperlipidemia     3. Essential hypertension              Recommendations:  1. Essential hypertension  1. I did ask him to take his blood pressure no more than 2-3 times daily as it seems that when he checks it, and it is elevated, he will continue to check multiple times throughout that day. I suspect he becomes anxious when he sees his blood pressure is elevated and because of anxiety it will remain elevated throughout the day.   2. I did offer adjustment with his antihypertensives today since his 90 day average has been elevated, 145/86, however, he would like to continue with lifestyle changes which does seem to be doing great for him as last couple weeks his blood pressure has been doing excellent. I informed him that goal blood pressure would preferably be less than 140/80. He expressed understanding.   2. Aortic stenosis  1. clinically doing excellent. He is now able to perform all his ADLs and he tells me he has even been trying to exercise recently. He even tells me he participated in a local 2 mile road race where he jogged/walked it and didn't have any issues with this. He  also denies any orthopnea or PND.   2. He reports that he has follow up with Dr. Aguilar within the next few weeks.       No follow-ups on file.    As always, I appreciate very much the opportunity to participate in the cardiovascular care of your patients.      With Best Regards,    Jesse Marcano PA-C

## 2021-11-19 ENCOUNTER — HOSPITAL ENCOUNTER (OUTPATIENT)
Dept: INFUSION THERAPY | Facility: HOSPITAL | Age: 61
Discharge: HOME | End: 2021-11-19
Admitting: INTERNAL MEDICINE

## 2021-11-19 VITALS
SYSTOLIC BLOOD PRESSURE: 175 MMHG | TEMPERATURE: 101.2 F | HEART RATE: 98 BPM | RESPIRATION RATE: 18 BRPM | DIASTOLIC BLOOD PRESSURE: 93 MMHG | OXYGEN SATURATION: 99 %

## 2021-11-19 DIAGNOSIS — U07.1 COVID-19: Primary | ICD-10-CM

## 2021-11-19 PROCEDURE — M0243 CASIRIVI AND IMDEVI INFUSION: HCPCS | Performed by: INTERNAL MEDICINE

## 2021-11-19 PROCEDURE — 25010000002 INJECTION, CASIRIVIMAB AND IMDEVIMAB, 1200 MG: Performed by: INTERNAL MEDICINE

## 2021-11-19 RX ORDER — METHYLPREDNISOLONE SODIUM SUCCINATE 125 MG/2ML
125 INJECTION, POWDER, LYOPHILIZED, FOR SOLUTION INTRAMUSCULAR; INTRAVENOUS AS NEEDED
Status: DISCONTINUED | OUTPATIENT
Start: 2021-11-19 | End: 2021-11-21 | Stop reason: HOSPADM

## 2021-11-19 RX ORDER — DIPHENHYDRAMINE HCL 50 MG
50 CAPSULE ORAL ONCE AS NEEDED
Status: DISCONTINUED | OUTPATIENT
Start: 2021-11-19 | End: 2021-11-21 | Stop reason: HOSPADM

## 2021-11-19 RX ORDER — EPINEPHRINE 1 MG/ML
0.3 INJECTION, SOLUTION INTRAMUSCULAR; SUBCUTANEOUS AS NEEDED
Status: DISCONTINUED | OUTPATIENT
Start: 2021-11-19 | End: 2021-11-21 | Stop reason: HOSPADM

## 2021-11-19 RX ORDER — METHYLPREDNISOLONE SODIUM SUCCINATE 125 MG/2ML
125 INJECTION, POWDER, LYOPHILIZED, FOR SOLUTION INTRAMUSCULAR; INTRAVENOUS AS NEEDED
Status: CANCELLED | OUTPATIENT
Start: 2021-11-19

## 2021-11-19 RX ORDER — DIPHENHYDRAMINE HYDROCHLORIDE 50 MG/ML
50 INJECTION INTRAMUSCULAR; INTRAVENOUS ONCE AS NEEDED
Status: DISCONTINUED | OUTPATIENT
Start: 2021-11-19 | End: 2021-11-21 | Stop reason: HOSPADM

## 2021-11-19 RX ORDER — DIPHENHYDRAMINE HYDROCHLORIDE 50 MG/ML
50 INJECTION INTRAMUSCULAR; INTRAVENOUS ONCE AS NEEDED
Status: CANCELLED | OUTPATIENT
Start: 2021-11-19

## 2021-11-19 RX ORDER — DIPHENHYDRAMINE HCL 50 MG
50 CAPSULE ORAL ONCE AS NEEDED
Status: CANCELLED | OUTPATIENT
Start: 2021-11-19

## 2021-11-19 RX ORDER — EPINEPHRINE 1 MG/ML
0.3 INJECTION, SOLUTION INTRAMUSCULAR; SUBCUTANEOUS AS NEEDED
Status: CANCELLED | OUTPATIENT
Start: 2021-11-19

## 2021-11-19 RX ADMIN — IMDEVIMAB: 1332 INJECTION, SOLUTION, CONCENTRATE INTRAVENOUS at 13:15

## 2022-03-31 ENCOUNTER — OFFICE VISIT (OUTPATIENT)
Dept: CARDIOLOGY | Facility: CLINIC | Age: 62
End: 2022-03-31

## 2022-03-31 VITALS
RESPIRATION RATE: 16 BRPM | DIASTOLIC BLOOD PRESSURE: 74 MMHG | SYSTOLIC BLOOD PRESSURE: 124 MMHG | HEART RATE: 73 BPM | HEIGHT: 71 IN | TEMPERATURE: 96.9 F | WEIGHT: 223.6 LBS | BODY MASS INDEX: 31.3 KG/M2

## 2022-03-31 DIAGNOSIS — I35.0 AORTIC STENOSIS, SEVERE: Primary | ICD-10-CM

## 2022-03-31 DIAGNOSIS — I10 ESSENTIAL HYPERTENSION: ICD-10-CM

## 2022-03-31 PROCEDURE — 93000 ELECTROCARDIOGRAM COMPLETE: CPT | Performed by: PHYSICIAN ASSISTANT

## 2022-03-31 PROCEDURE — 99213 OFFICE O/P EST LOW 20 MIN: CPT | Performed by: PHYSICIAN ASSISTANT

## 2022-03-31 NOTE — PROGRESS NOTES
Jonathan Fernandez MD  Fidel Roberson  1960 03/31/2022    Patient Active Problem List   Diagnosis   • Nonrheumatic aortic valve stenosis   • TAVR 04/12/21   • Essential hypertension   • Mixed hyperlipidemia   • Elevated hemoglobin A1c   • History of malignant neoplasm of testis   • COVID-19       Dear Jonathan Fernandez MD:    Subjective     History of Present Illness:    Chief Complaint   Patient presents with   •  TAVR     04/12/21   • Med Management     verbal   • Follow-up     1 year       Fidel Roberson is a pleasant 62 y.o. male with a past medical history significant for  aortic stenosis status post TAVR on 4/12/2021, mild nonobstructive CAD, essential hypertension, dyslipidemia.  He comes in today for routine cardiology follow-up.      Fidel has no complaints with open questions today.  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  He denies any issues with this such as chest pains, shortness of breath, dizziness, or syncope.      No Known Allergies:      Current Outpatient Medications:   •  amLODIPine (NORVASC) 5 MG tablet, Take 1 tablet by mouth Daily., Disp: 14 tablet, Rfl: 0  •  aspirin 81 MG EC tablet, Take 162 mg by mouth Daily., Disp: , Rfl:   •  coenzyme Q10 100 MG capsule, Take 200 mg by mouth Daily., Disp: , Rfl:   •  losartan (COZAAR) 50 MG tablet, Take 1 tablet by mouth Daily., Disp: 90 tablet, Rfl: 3  •  melatonin 5 MG tablet tablet, Take 5 mg by mouth Every Night., Disp: , Rfl:   •  Omega-3 1000 MG capsule, Take  by mouth Daily., Disp: , Rfl:   •  simvastatin (ZOCOR) 40 MG tablet, Take 40 mg by mouth Every Night., Disp: , Rfl:     The following portions of the patient's history were reviewed and updated as appropriate: allergies, current medications, past family history, past medical history, past social history, past surgical history and problem list.    Social History     Tobacco Use   • Smoking status:  "Former Smoker     Packs/day: 1.00     Years: 30.00     Pack years: 30.00     Types: Cigarettes     Quit date: 2010     Years since quittin.1   • Smokeless tobacco: Never Used   Substance Use Topics   • Alcohol use: No   • Drug use: Never         Objective   Vitals:    22 1006   BP: 124/74   Pulse: 73   Resp: 16   Temp: 96.9 °F (36.1 °C)   Weight: 101 kg (223 lb 9.6 oz)   Height: 180.3 cm (71\")     Body mass index is 31.19 kg/m².    Constitutional:       General: Not in acute distress.     Appearance: Healthy appearance. Well-developed and not in distress. Not diaphoretic.   Eyes:      Conjunctiva/sclera: Conjunctivae normal.      Pupils: Pupils are equal, round, and reactive to light.   HENT:      Head: Normocephalic and atraumatic.   Neck:      Vascular: No carotid bruit or JVD.   Pulmonary:      Effort: Pulmonary effort is normal. No respiratory distress.      Breath sounds: Normal breath sounds.   Cardiovascular:      Normal rate. Regular rhythm.      Murmurs: There is a grade 2/6 harsh midsystolic murmur at the URSB, radiating to the neck.   Edema:     Peripheral edema absent.   Skin:     General: Skin is cool.   Neurological:      Mental Status: Alert, oriented to person, place, and time and oriented to person, place and time.         Lab Results   Component Value Date     2021    K 4.4 2021     2021    CO2 24.8 2021    BUN 28 (H) 2021    CREATININE 1.16 2021    GLUCOSE 89 2021    CALCIUM 9.9 2021    AST 27 2021    ALT 27 2021    ALKPHOS 85 2021     No results found for: CKTOTAL  Lab Results   Component Value Date    WBC 8.80 2021    HGB 15.5 2021    HCT 49.8 2021     2021     Lab Results   Component Value Date    INR 1.01 2021     Lab Results   Component Value Date    MG 1.9 2021     No results found for: TSH, PSA, CHLPL, TRIG, HDL, LDL   No results found for: BNP    During " this visit the following were done:  Labs Reviewed []    Labs Ordered []    Radiology Reports Reviewed []    Radiology Ordered []    PCP Records Reviewed []    Referring Provider Records Reviewed []    ER Records Reviewed []    Hospital Records Reviewed []    History Obtained From Family []    Radiology Images Reviewed []    Other Reviewed []    Records Requested []         ECG 12 Lead    Date/Time: 3/31/2022 10:49 AM  Performed by: Jesse Marcano PA-C  Authorized by: Jesse Marcano PA-C   Comparison: compared with previous ECG   Similar to previous ECG  Rhythm: sinus rhythm  Conduction: right bundle branch block, left anterior fascicular block and bifascicular block    Clinical impression: abnormal EKG            Assessment/Plan    Diagnosis Plan   1. TAVR 04/12/21     2. Essential hypertension              Recommendations:  1. Aortic stenosis status post TAVR  1. Clinically doing well completely asymptomatic.  He does have an appointment with aortic valve team in [place] next month I suspect they will be reordering echocardiogram for routine surveillance will defer to them. Patient expressed understanding with this.   2. Essential hypertension  1. Currently well controlled we will continue amlodipine, losartan, and simvastatin.      No follow-ups on file.    As always, I appreciate very much the opportunity to participate in the cardiovascular care of your patients.      With Best Regards,    Jesse Marcano PA-C

## 2022-04-07 DIAGNOSIS — I35.0 AORTIC STENOSIS, SEVERE: ICD-10-CM

## 2022-04-07 DIAGNOSIS — I35.0 NONRHEUMATIC AORTIC VALVE STENOSIS: Primary | ICD-10-CM

## 2022-04-07 NOTE — PROGRESS NOTES
TAVR SPRING    One year TAVR anniversary 4/12/22.  Patient is scheduled @ [facility] for 5/23/22 @ 2 PM.  See order below for the one year post op echo (sending to S).    Shirley LONDONO

## 2022-05-23 ENCOUNTER — OFFICE VISIT (OUTPATIENT)
Dept: CARDIOLOGY | Facility: HOSPITAL | Age: 62
End: 2022-05-23

## 2022-05-23 VITALS
BODY MASS INDEX: 31.22 KG/M2 | TEMPERATURE: 97.9 F | HEART RATE: 80 BPM | OXYGEN SATURATION: 97 % | HEIGHT: 71 IN | WEIGHT: 223 LBS | DIASTOLIC BLOOD PRESSURE: 83 MMHG | SYSTOLIC BLOOD PRESSURE: 137 MMHG | RESPIRATION RATE: 20 BRPM

## 2022-05-23 DIAGNOSIS — I10 ESSENTIAL HYPERTENSION: ICD-10-CM

## 2022-05-23 DIAGNOSIS — I35.0 AORTIC STENOSIS, SEVERE: Primary | ICD-10-CM

## 2022-05-23 RX ORDER — ROSUVASTATIN CALCIUM 20 MG/1
20 TABLET, COATED ORAL DAILY
COMMUNITY
Start: 2022-05-09

## 2022-05-23 RX ORDER — METOPROLOL SUCCINATE 25 MG/1
25 TABLET, EXTENDED RELEASE ORAL DAILY
COMMUNITY
Start: 2022-04-11

## 2022-05-23 NOTE — PROGRESS NOTES
Five Meter Walk Test    Fidel Roberson  1960  [id number]  05/23/22        Utilized Walking Aid? No     Walk 1: 3.99 s/5m     Walk 2: 5.99 s/5m     Walk 3: 4.10 s/5m    Five Meter Walk Average: 4.7 s/5m    Gait Speed: Normal (Average < or = 6 s/5m)      Lyn Conklin MA, 05/23/22              KCQ12 score is 68/70 = NYHA class I symptoms.  Patient scheduled to see Cardiology today with echo.  No further follow up required @ TAVR clinic.    Shirley LONDONO

## 2022-09-28 NOTE — OP NOTE
Transcatheter aortic valve replacement    Indication: 61-year-old man with severe symptomatic aortic stenosis    Interventional cardiologist: MD Navneet Krueger MD    Cardiothoracic surgeons: MD Timo Zamora MD    Procedures:    Insertion of 6 Japanese left femoral artery sheath  Insertion of 8 Japanese left femoral vein sheath  Insertion of 5 Japanese temporary pacing catheter into the right ventricle  Insertion of 6 Japanese pigtail catheter from femoral artery sheath to a sending aorta  Multiple intraprocedural aortograms  Balloon aortic valvuloplasty  Deployment of #26 Marcano TYLER 3 tissue valve      Description of procedure: After informed and signed consent was obtained the patient was brought to the operating room and placed on the operating room table in the supine position.  The patient was prepped in the standard surgical technique from knee-to-chest.  A 6 Japanese sheath was then placed in the left femoral artery using a modified Seldinger technique and an 8 Japanese sheath placed in the left femoral vein in a similar fashion.  Both sheaths were flushed with heparinized saline.  A 5 Japanese temporary pacing catheter was then taken up from the left femoral vein sheath to the right ventricular apex for rapid ventricular pacing.  A 6 Japanese pigtail catheter was taken up from the left femoral artery sheath to the a sending aorta for intraprocedural aortograms.  Accomplishing this, the surgeons then obtained arterial access in the right femoral artery.  See separate note.  A 6 Japanese AL 2 catheter was then taken up from the sheath placed in the right femoral artery and the aortic valve crossed with a straight wire.  The straight wire was then exchanged for a safari wire.  The AL catheter was then removed.  Balloon aortic valvuloplasty was then performed under a rapid ventricular pacing protocol.   Accomplishing balloon aortic valvuloplasty, the Marcano sapient 3 valve was advanced into the proper position and deployed under rapid ventricular pacing protocol.  At the completion of the procedure the wire was removed and the ventricle and intraoperative transesophageal echocardiography as well as an aortogram demonstrated satisfactory valve position and placement.  Then the surgeons remove the arterial access in the right femoral artery.  See separate note.  The temporary pacing wire and the pigtail catheter were then removed and the sheaths in the left femoral vessels sutured in place.  The patient was then prepared for transport back to the ICU in satisfactory condition    Conclusion: Successful deployment of #26 Marcano jorge 3 tissue valve for severe aortic stenosis.  There were no immediate complications noted     Abdominal Pain, N/V/D

## 2022-10-14 ENCOUNTER — TELEPHONE (OUTPATIENT)
Dept: CARDIOLOGY | Facility: CLINIC | Age: 62
End: 2022-10-14

## 2022-10-14 NOTE — TELEPHONE ENCOUNTER
Caller: Fidel Roberson    Relationship to patient: Self    Best call back number: [phone]    Patient is needing: PT CALLED IN TO SEE IF HE STILL NEEDS TO SEE KVNG DELACURZ IN THE OFFICE. HE HAS BEEN SEEING DR. GREENE IN [place] AND [facility] HARD FOR HIM TO AFFORD CO-PAYS AT BOTH OFFICES.

## 2022-10-19 NOTE — TELEPHONE ENCOUNTER
As long as he is following regularly with a cardiologist office it is okay to only see Dr. Nicholas if  he so chooses to.

## 2023-01-04 NOTE — TELEPHONE ENCOUNTER
Patient called wanting to confirm that he would be at his appointment for his CT on 3/16. Patient was not sure the location of this test. He knows it is in [place].    Called  Da and reviewed instructions/ location and prep for CTA TAVR protocol on 3/16.  Provided pre-op studies are acceptable, anticipate TAVR for 3/25/21.    Will meed with Mr. Roberson in [place].    Shirley LONDONO   Current Events/Vocabulary

## 2024-10-06 NOTE — DISCHARGE INSTRUCTIONS
Occupational Therapy  Facility/Department: [facility] C3 TELE/MED SURG/ONC  Occupational Therapy Initial Assessment    Name: Kavon Roberts  : 1946  MRN: [id number]  Date of Service: 10/6/2024    Discharge Recommendations:  24 hour supervision or assist, Home with Home health OT          Patient Diagnosis(es): The primary encounter diagnosis was Altered mental status, unspecified altered mental status type. Diagnoses of Fall, initial encounter and Injury of head, initial encounter were also pertinent to this visit.  Past Medical History:  has a past medical history of Diabetes mellitus (HCC) and Hypercholesteremia.  Past Surgical History:  has a past surgical history that includes back surgery; Appendectomy; hip surgery; and Ankle surgery (Left).       Therapy discharge recommendations are subject to collaboration from the patient’s interdisciplinary healthcare team, including MD and case management recommendations.    If pt is unable to be seen after this session, please let this note serve as discharge summary.  Please see case management note for discharge disposition.  Thank you.      Assessment  Performance deficits / Impairments: Decreased ADL status;Decreased balance;Decreased coordination;Decreased functional mobility ;Decreased high-level IADLs;Decreased strength;Decreased endurance  Assessment: Pt presents to [facility] with altered mental status. Pt PTA IND with ADLs, daughter completes some IADLs. Pt this date requiring increased assistance for functional mobility as well as increased time for ADLs. Pt would continue to benefit from acute OT at this time to improve functional mobility and ADL independence. D/c recs for home with 24hr, HHOT when medically ready.  Prognosis: Good  Decision Making: Medium Complexity  REQUIRES OT FOLLOW-UP: Yes  Activity Tolerance  Activity Tolerance: Patient limited by fatigue     Plan  Occupational Therapy Plan  Times Per Week: 3-5x/week  Current Treatment Recommendations:  Read all instructions in this handout.   Call your primary care physician for a follow up appointment in 1-2 days.   Return to the emergency department as soon as possible for worsening of your symptoms or for any other concerns that you may have.

## 2025-01-26 ENCOUNTER — APPOINTMENT (OUTPATIENT)
Dept: GENERAL RADIOLOGY | Facility: HOSPITAL | Age: 65
End: 2025-01-26
Payer: MEDICARE

## 2025-01-26 ENCOUNTER — HOSPITAL ENCOUNTER (EMERGENCY)
Facility: HOSPITAL | Age: 65
Discharge: HOME | End: 2025-01-26
Attending: EMERGENCY MEDICINE | Admitting: STUDENT IN AN ORGANIZED HEALTH CARE EDUCATION/TRAINING PROGRAM
Payer: MEDICARE

## 2025-01-26 VITALS
SYSTOLIC BLOOD PRESSURE: 153 MMHG | DIASTOLIC BLOOD PRESSURE: 97 MMHG | WEIGHT: 238 LBS | HEART RATE: 76 BPM | BODY MASS INDEX: 32.23 KG/M2 | OXYGEN SATURATION: 98 % | HEIGHT: 72 IN | RESPIRATION RATE: 14 BRPM | TEMPERATURE: 97.7 F

## 2025-01-26 DIAGNOSIS — I10 HYPERTENSION, UNSPECIFIED TYPE: Primary | ICD-10-CM

## 2025-01-26 LAB
ALBUMIN SERPL-MCNC: 4.3 G/DL (ref 3.5–5.2)
ALBUMIN/GLOB SERPL: 1 G/DL
ALP SERPL-CCNC: 88 U/L (ref 39–117)
ALT SERPL W P-5'-P-CCNC: 35 U/L (ref 1–41)
ANION GAP SERPL CALCULATED.3IONS-SCNC: 13.3 MMOL/L (ref 5–15)
AST SERPL-CCNC: 31 U/L (ref 1–40)
BACTERIA UR QL AUTO: ABNORMAL /HPF
BASOPHILS # BLD AUTO: 0.05 10*3/MM3 (ref 0–0.2)
BASOPHILS NFR BLD AUTO: 0.6 % (ref 0–1.5)
BILIRUB SERPL-MCNC: 0.3 MG/DL (ref 0–1.2)
BILIRUB UR QL STRIP: NEGATIVE
BUN SERPL-MCNC: 16 MG/DL (ref 8–23)
BUN/CREAT SERPL: 14.7 (ref 7–25)
CALCIUM SPEC-SCNC: 9.8 MG/DL (ref 8.6–10.5)
CHLORIDE SERPL-SCNC: 100 MMOL/L (ref 98–107)
CLARITY UR: CLEAR
CO2 SERPL-SCNC: 24.7 MMOL/L (ref 22–29)
COLOR UR: YELLOW
CREAT SERPL-MCNC: 1.09 MG/DL (ref 0.76–1.27)
DEPRECATED RDW RBC AUTO: 41.9 FL (ref 37–54)
EGFRCR SERPLBLD CKD-EPI 2021: 74.4 ML/MIN/1.73
EOSINOPHIL # BLD AUTO: 0.1 10*3/MM3 (ref 0–0.4)
EOSINOPHIL NFR BLD AUTO: 1.1 % (ref 0.3–6.2)
ERYTHROCYTE [DISTWIDTH] IN BLOOD BY AUTOMATED COUNT: 13.2 % (ref 12.3–15.4)
GEN 5 1HR TROPONIN T REFLEX: 11 NG/L
GLOBULIN UR ELPH-MCNC: 4.2 GM/DL
GLUCOSE SERPL-MCNC: 104 MG/DL (ref 65–99)
GLUCOSE UR STRIP-MCNC: NEGATIVE MG/DL
HCT VFR BLD AUTO: 45.8 % (ref 37.5–51)
HGB BLD-MCNC: 14.6 G/DL (ref 13–17.7)
HGB UR QL STRIP.AUTO: NEGATIVE
HOLD SPECIMEN: NORMAL
HOLD SPECIMEN: NORMAL
HYALINE CASTS UR QL AUTO: ABNORMAL /LPF
IMM GRANULOCYTES # BLD AUTO: 0.01 10*3/MM3 (ref 0–0.05)
IMM GRANULOCYTES NFR BLD AUTO: 0.1 % (ref 0–0.5)
KETONES UR QL STRIP: NEGATIVE
LEUKOCYTE ESTERASE UR QL STRIP.AUTO: ABNORMAL
LYMPHOCYTES # BLD AUTO: 3.49 10*3/MM3 (ref 0.7–3.1)
LYMPHOCYTES NFR BLD AUTO: 39.7 % (ref 19.6–45.3)
MCH RBC QN AUTO: 27.7 PG (ref 26.6–33)
MCHC RBC AUTO-ENTMCNC: 31.9 G/DL (ref 31.5–35.7)
MCV RBC AUTO: 86.9 FL (ref 79–97)
MONOCYTES # BLD AUTO: 0.85 10*3/MM3 (ref 0.1–0.9)
MONOCYTES NFR BLD AUTO: 9.7 % (ref 5–12)
NEUTROPHILS NFR BLD AUTO: 4.3 10*3/MM3 (ref 1.7–7)
NEUTROPHILS NFR BLD AUTO: 48.8 % (ref 42.7–76)
NITRITE UR QL STRIP: NEGATIVE
NRBC BLD AUTO-RTO: 0 /100 WBC (ref 0–0.2)
NT-PROBNP SERPL-MCNC: <36 PG/ML (ref 0–900)
PH UR STRIP.AUTO: 8 [PH] (ref 5–8)
PLATELET # BLD AUTO: 158 10*3/MM3 (ref 140–450)
PMV BLD AUTO: 10 FL (ref 6–12)
POTASSIUM SERPL-SCNC: 4.1 MMOL/L (ref 3.5–5.2)
PROT SERPL-MCNC: 8.5 G/DL (ref 6–8.5)
PROT UR QL STRIP: NEGATIVE
RBC # BLD AUTO: 5.27 10*6/MM3 (ref 4.14–5.8)
RBC # UR STRIP: ABNORMAL /HPF
REF LAB TEST METHOD: ABNORMAL
SODIUM SERPL-SCNC: 138 MMOL/L (ref 136–145)
SP GR UR STRIP: 1.01 (ref 1–1.03)
SQUAMOUS #/AREA URNS HPF: ABNORMAL /HPF
TROPONIN T NUMERIC DELTA: 0 NG/L
TROPONIN T SERPL HS-MCNC: 11 NG/L
UROBILINOGEN UR QL STRIP: ABNORMAL
WBC # UR STRIP: ABNORMAL /HPF
WBC NRBC COR # BLD AUTO: 8.8 10*3/MM3 (ref 3.4–10.8)
WHOLE BLOOD HOLD COAG: NORMAL
WHOLE BLOOD HOLD SPECIMEN: NORMAL

## 2025-01-26 PROCEDURE — 84484 ASSAY OF TROPONIN QUANT: CPT | Performed by: PHYSICIAN ASSISTANT

## 2025-01-26 PROCEDURE — 85025 COMPLETE CBC W/AUTO DIFF WBC: CPT | Performed by: PHYSICIAN ASSISTANT

## 2025-01-26 PROCEDURE — 99284 EMERGENCY DEPT VISIT MOD MDM: CPT

## 2025-01-26 PROCEDURE — 93005 ELECTROCARDIOGRAM TRACING: CPT | Performed by: PHYSICIAN ASSISTANT

## 2025-01-26 PROCEDURE — 83880 ASSAY OF NATRIURETIC PEPTIDE: CPT | Performed by: PHYSICIAN ASSISTANT

## 2025-01-26 PROCEDURE — 80053 COMPREHEN METABOLIC PANEL: CPT | Performed by: PHYSICIAN ASSISTANT

## 2025-01-26 PROCEDURE — 36415 COLL VENOUS BLD VENIPUNCTURE: CPT

## 2025-01-26 PROCEDURE — 71045 X-RAY EXAM CHEST 1 VIEW: CPT

## 2025-01-26 PROCEDURE — 71045 X-RAY EXAM CHEST 1 VIEW: CPT | Performed by: RADIOLOGY

## 2025-01-26 PROCEDURE — 81001 URINALYSIS AUTO W/SCOPE: CPT | Performed by: PHYSICIAN ASSISTANT

## 2025-01-26 RX ORDER — LOSARTAN POTASSIUM 50 MG/1
100 TABLET ORAL DAILY
Qty: 60 TABLET | Refills: 0 | Status: SHIPPED | OUTPATIENT
Start: 2025-01-26 | End: 2025-02-25

## 2025-01-26 NOTE — ED NOTES
MEDICAL SCREENING:    Reason for Visit: B/P up all day and has hx of htn.  Pt has taken his meds and no improvement     Patient initially seen in triage.  The patient was advised further evaluation and diagnostic testing will be needed, some of the treatment and testing will be initiated in the lobby in order to begin the process.  The patient will be returned to the waiting area for the time being and possibly be re-assessed by a subsequent ED provider.  The patient will be brought back to the treatment area in as timely manner as possible.      Leela Owens PA  01/26/25 1829

## 2025-01-28 LAB
QT INTERVAL: 404 MS
QTC INTERVAL: 454 MS

## 2025-01-30 NOTE — ED PROVIDER NOTES
Subjective   History of Present Illness  Pt states his blood pressure has been going up all day, has hx of htn, has had all his maintenance meds today and one extra amlodipine, denies headache/dizziness but admits feeling a little jittery  Pt has PMHx of htn, hyperlipidemia, cancer, SBO, heart murmur, aortic valve disease, [place],     Hypertension  Severity:  Moderate  Onset quality:  Sudden  Timing:  Constant      Review of Systems    Past Medical History:   Diagnosis Date    Aortic valve disease     Cancer     testicular    Elevated hemoglobin A1c     Heart murmur     [place] (hard of hearing)     hearing aides    Hyperlipidemia     Hypertension     Small bowel obstruction     Wears dentures     top    Wears glasses        No Known Allergies    Past Surgical History:   Procedure Laterality Date    AORTIC VALVE REPAIR/REPLACEMENT N/A 4/12/2021    Procedure: TRANSCATHETER AORTIC VALVE REPLACEMENT;  Surgeon: Fidel Sandoval MD;  Location: [facility];  Service: Cardiothoracic;  Laterality: N/A;  90mls [id number]  8mins 30 secs  97mGy    AORTIC VALVE REPAIR/REPLACEMENT N/A 4/12/2021    Procedure: TRANSCATHETER AORTIC VALVE INSERTION;  Surgeon: Arie Nicholas MD;  Location:  [facility];  Service: Cardiovascular;  Laterality: N/A;    CARDIAC CATHETERIZATION N/A 3/26/2021    Procedure: Right and Left Heart Cath;  Surgeon: Emre Guzmán MD;  Location: [facility] CATH INVASIVE LOCATION;  Service: Cardiology;  Laterality: N/A;    COLONOSCOPY      SMALL INTESTINE SURGERY      2005    TESTICLE SURGERY      TRANSESOPHAGEAL ECHOCARDIOGRAM (NIKKO) N/A 4/12/2021    Procedure: TRANSESOPHAGEAL ECHOCARDIOGRAM WITH ANESTHESIA;  Surgeon: Fidel Sandoval MD;  Location: [facility];  Service: Cardiothoracic;  Laterality: N/A;       Family History   Problem Relation Age of Onset    Diabetes Mother     Heart disease Mother     Heart disease Father     Stroke Brother     No Known Problems Brother        Social History      Socioeconomic History    Marital status:      Spouse name: Deloris    Number of children: 1    Years of education: HS   Tobacco Use    Smoking status: Former     Current packs/day: 0.00     Average packs/day: 1 pack/day for 30.0 years (30.0 ttl pk-yrs)     Types: Cigarettes     Start date: 2/2/1980     Quit date: 2/2/2010     Years since quitting: 15.0    Smokeless tobacco: Never   Substance and Sexual Activity    Alcohol use: No    Drug use: Never    Sexual activity: Defer           Objective   Physical Exam  Vitals and nursing note reviewed.   Constitutional:       Appearance: He is well-developed.   HENT:      Head: Normocephalic.   Cardiovascular:      Rate and Rhythm: Normal rate and regular rhythm.   Pulmonary:      Effort: Pulmonary effort is normal.      Breath sounds: Normal breath sounds.   Abdominal:      General: Bowel sounds are normal.      Palpations: Abdomen is soft.      Tenderness: There is no abdominal tenderness.   Musculoskeletal:         General: Normal range of motion.      Cervical back: Neck supple.   Skin:     General: Skin is warm and dry.   Neurological:      Mental Status: He is alert and oriented to person, place, and time.   Psychiatric:         Behavior: Behavior normal.         Thought Content: Thought content normal.         Judgment: Judgment normal.         Procedures           ED Course     Results for orders placed or performed during the hospital encounter of 01/26/25   ECG 12 Lead Chest Pain    Collection Time: 01/26/25  6:34 PM   Result Value Ref Range    QT Interval 404 ms    QTC Interval 454 ms   Comprehensive Metabolic Panel    Collection Time: 01/26/25  6:43 PM    Specimen: Blood   Result Value Ref Range    Glucose 104 (H) 65 - 99 mg/dL    BUN 16 8 - 23 mg/dL    Creatinine 1.09 0.76 - 1.27 mg/dL    Sodium 138 136 - 145 mmol/L    Potassium 4.1 3.5 - 5.2 mmol/L    Chloride 100 98 - 107 mmol/L    CO2 24.7 22.0 - 29.0 mmol/L    Calcium 9.8 8.6 - 10.5 mg/dL    Total  Protein 8.5 6.0 - 8.5 g/dL    Albumin 4.3 3.5 - 5.2 g/dL    ALT (SGPT) 35 1 - 41 U/L    AST (SGOT) 31 1 - 40 U/L    Alkaline Phosphatase 88 39 - 117 U/L    Total Bilirubin 0.3 0.0 - 1.2 mg/dL    Globulin 4.2 gm/dL    A/G Ratio 1.0 g/dL    BUN/Creatinine Ratio 14.7 7.0 - 25.0    Anion Gap 13.3 5.0 - 15.0 mmol/L    eGFR 74.4 >60.0 mL/min/1.73   High Sensitivity Troponin T    Collection Time: 01/26/25  6:43 PM    Specimen: Blood   Result Value Ref Range    HS Troponin T 11 <22 ng/L   BNP    Collection Time: 01/26/25  6:43 PM    Specimen: Blood   Result Value Ref Range    proBNP <36.0 0.0 - 900.0 pg/mL   CBC Auto Differential    Collection Time: 01/26/25  6:43 PM    Specimen: Blood   Result Value Ref Range    WBC 8.80 3.40 - 10.80 10*3/mm3    RBC 5.27 4.14 - 5.80 10*6/mm3    Hemoglobin 14.6 13.0 - 17.7 g/dL    Hematocrit 45.8 37.5 - 51.0 %    MCV 86.9 79.0 - 97.0 fL    MCH 27.7 26.6 - 33.0 pg    MCHC 31.9 31.5 - 35.7 g/dL    RDW 13.2 12.3 - 15.4 %    RDW-SD 41.9 37.0 - 54.0 fl    MPV 10.0 6.0 - 12.0 fL    Platelets 158 140 - 450 10*3/mm3    Neutrophil % 48.8 42.7 - 76.0 %    Lymphocyte % 39.7 19.6 - 45.3 %    Monocyte % 9.7 5.0 - 12.0 %    Eosinophil % 1.1 0.3 - 6.2 %    Basophil % 0.6 0.0 - 1.5 %    Immature Grans % 0.1 0.0 - 0.5 %    Neutrophils, Absolute 4.30 1.70 - 7.00 10*3/mm3    Lymphocytes, Absolute 3.49 (H) 0.70 - 3.10 10*3/mm3    Monocytes, Absolute 0.85 0.10 - 0.90 10*3/mm3    Eosinophils, Absolute 0.10 0.00 - 0.40 10*3/mm3    Basophils, Absolute 0.05 0.00 - 0.20 10*3/mm3    Immature Grans, Absolute 0.01 0.00 - 0.05 10*3/mm3    nRBC 0.0 0.0 - 0.2 /100 WBC   Green Top (Gel)    Collection Time: 01/26/25  6:43 PM   Result Value Ref Range    Extra Tube Hold for add-ons.    Lavender Top    Collection Time: 01/26/25  6:43 PM   Result Value Ref Range    Extra Tube hold for add-on    Gold Top - SST    Collection Time: 01/26/25  6:43 PM   Result Value Ref Range    Extra Tube Hold for add-ons.    Light Blue Top     Collection Time: 01/26/25  6:43 PM   Result Value Ref Range    Extra Tube Hold for add-ons.    Urinalysis With Culture If Indicated - Urine, Clean Catch    Collection Time: 01/26/25  6:50 PM    Specimen: Urine, Clean Catch   Result Value Ref Range    Color, UA Yellow Yellow, Straw    Appearance, UA Clear Clear    pH, UA 8.0 5.0 - 8.0    Specific Gravity, UA 1.007 1.005 - 1.030    Glucose, UA Negative Negative    Ketones, UA Negative Negative    Bilirubin, UA Negative Negative    Blood, UA Negative Negative    Protein, UA Negative Negative    Leuk Esterase, UA Trace (A) Negative    Nitrite, UA Negative Negative    Urobilinogen, UA 0.2 E.U./dL 0.2 - 1.0 E.U./dL   Urinalysis, Microscopic Only - Urine, Clean Catch    Collection Time: 01/26/25  6:50 PM    Specimen: Urine, Clean Catch   Result Value Ref Range    RBC, UA None Seen None Seen, 0-2 /HPF    WBC, UA 3-5 (A) None Seen, 0-2 /HPF    Bacteria, UA None Seen None Seen /HPF    Squamous Epithelial Cells, UA None Seen None Seen, 0-2 /HPF    Hyaline Casts, UA None Seen None Seen /LPF    Methodology Automated Microscopy    High Sensitivity Troponin T 1Hr    Collection Time: 01/26/25  7:46 PM    Specimen: Blood   Result Value Ref Range    HS Troponin T 11 <22 ng/L    Troponin T Numeric Delta 0 Abnormal if >/=3 ng/L             XR Chest 1 View   Final Result       Normal heart size   Hypoinflated lungs   Slightly elevated left hemidiaphragm.   No pleural effusion or pneumothorax   No fracture or foreign body.   Slight levoconvex curve at the mid thoracic spine.   Aortic valve replacement with endograft stent in place.       This report was finalized on 1/26/2025 7:29 PM by Aaron Bhatt MD.                                                       Medical Decision Making      Final diagnoses:   Hypertension, unspecified type       ED Disposition  ED Disposition       ED Disposition   Discharge    Condition   Stable    Comment   --               Jonathan Fernandez MD  [address]  [place] [address]  [phone]    In 2 days           Medication List        Changed      losartan 50 MG tablet  Commonly known as: COZAAR  Take 2 tablets by mouth Daily for 30 days.  What changed: how much to take               Where to Get Your Medications        These medications were sent to [facility] - [place], [place] - [address] [place] [address] - [phone]  - [phone] [place] [address]      Phone: [phone]   losartan 50 MG tablet            Leela Owens PA  01/29/25 3078

## 2025-02-09 ENCOUNTER — HOSPITAL ENCOUNTER (EMERGENCY)
Facility: HOSPITAL | Age: 65
Discharge: HOME | End: 2025-02-09
Attending: FAMILY MEDICINE
Payer: MEDICARE

## 2025-02-09 VITALS
OXYGEN SATURATION: 100 % | HEART RATE: 84 BPM | WEIGHT: 238 LBS | RESPIRATION RATE: 14 BRPM | HEIGHT: 72 IN | SYSTOLIC BLOOD PRESSURE: 146 MMHG | BODY MASS INDEX: 32.23 KG/M2 | DIASTOLIC BLOOD PRESSURE: 94 MMHG | TEMPERATURE: 97.8 F

## 2025-02-09 DIAGNOSIS — I10 HYPERTENSION, UNSPECIFIED TYPE: Primary | ICD-10-CM

## 2025-02-09 LAB
ALBUMIN SERPL-MCNC: 4.7 G/DL (ref 3.5–5.2)
ALBUMIN/GLOB SERPL: 1.3 G/DL
ALP SERPL-CCNC: 83 U/L (ref 39–117)
ALT SERPL W P-5'-P-CCNC: 27 U/L (ref 1–41)
ANION GAP SERPL CALCULATED.3IONS-SCNC: 12.4 MMOL/L (ref 5–15)
AST SERPL-CCNC: 26 U/L (ref 1–40)
BACTERIA UR QL AUTO: ABNORMAL /HPF
BASOPHILS # BLD AUTO: 0.05 10*3/MM3 (ref 0–0.2)
BASOPHILS NFR BLD AUTO: 0.5 % (ref 0–1.5)
BILIRUB SERPL-MCNC: 0.3 MG/DL (ref 0–1.2)
BILIRUB UR QL STRIP: NEGATIVE
BUN SERPL-MCNC: 26 MG/DL (ref 8–23)
BUN/CREAT SERPL: 19.1 (ref 7–25)
CALCIUM SPEC-SCNC: 10.1 MG/DL (ref 8.6–10.5)
CHLORIDE SERPL-SCNC: 99 MMOL/L (ref 98–107)
CLARITY UR: ABNORMAL
CO2 SERPL-SCNC: 24.6 MMOL/L (ref 22–29)
COLOR UR: YELLOW
CREAT SERPL-MCNC: 1.36 MG/DL (ref 0.76–1.27)
DEPRECATED RDW RBC AUTO: 43.1 FL (ref 37–54)
EGFRCR SERPLBLD CKD-EPI 2021: 57 ML/MIN/1.73
EOSINOPHIL # BLD AUTO: 0.16 10*3/MM3 (ref 0–0.4)
EOSINOPHIL NFR BLD AUTO: 1.6 % (ref 0.3–6.2)
ERYTHROCYTE [DISTWIDTH] IN BLOOD BY AUTOMATED COUNT: 13.5 % (ref 12.3–15.4)
GLOBULIN UR ELPH-MCNC: 3.7 GM/DL
GLUCOSE SERPL-MCNC: 82 MG/DL (ref 65–99)
GLUCOSE UR STRIP-MCNC: NEGATIVE MG/DL
HCT VFR BLD AUTO: 46.3 % (ref 37.5–51)
HGB BLD-MCNC: 14.8 G/DL (ref 13–17.7)
HGB UR QL STRIP.AUTO: NEGATIVE
HOLD SPECIMEN: NORMAL
HYALINE CASTS UR QL AUTO: ABNORMAL /LPF
IMM GRANULOCYTES # BLD AUTO: 0.02 10*3/MM3 (ref 0–0.05)
IMM GRANULOCYTES NFR BLD AUTO: 0.2 % (ref 0–0.5)
KETONES UR QL STRIP: NEGATIVE
LEUKOCYTE ESTERASE UR QL STRIP.AUTO: ABNORMAL
LYMPHOCYTES # BLD AUTO: 3.75 10*3/MM3 (ref 0.7–3.1)
LYMPHOCYTES NFR BLD AUTO: 38 % (ref 19.6–45.3)
MCH RBC QN AUTO: 27.8 PG (ref 26.6–33)
MCHC RBC AUTO-ENTMCNC: 32 G/DL (ref 31.5–35.7)
MCV RBC AUTO: 87 FL (ref 79–97)
MONOCYTES # BLD AUTO: 0.86 10*3/MM3 (ref 0.1–0.9)
MONOCYTES NFR BLD AUTO: 8.7 % (ref 5–12)
NEUTROPHILS NFR BLD AUTO: 5.04 10*3/MM3 (ref 1.7–7)
NEUTROPHILS NFR BLD AUTO: 51 % (ref 42.7–76)
NITRITE UR QL STRIP: NEGATIVE
NRBC BLD AUTO-RTO: 0 /100 WBC (ref 0–0.2)
PH UR STRIP.AUTO: 6 [PH] (ref 5–8)
PLATELET # BLD AUTO: 168 10*3/MM3 (ref 140–450)
PMV BLD AUTO: 9.7 FL (ref 6–12)
POTASSIUM SERPL-SCNC: 4.2 MMOL/L (ref 3.5–5.2)
PROT SERPL-MCNC: 8.4 G/DL (ref 6–8.5)
PROT UR QL STRIP: NEGATIVE
QT INTERVAL: 424 MS
QTC INTERVAL: 467 MS
RBC # BLD AUTO: 5.32 10*6/MM3 (ref 4.14–5.8)
RBC # UR STRIP: ABNORMAL /HPF
REF LAB TEST METHOD: ABNORMAL
SODIUM SERPL-SCNC: 136 MMOL/L (ref 136–145)
SP GR UR STRIP: 1.01 (ref 1–1.03)
SQUAMOUS #/AREA URNS HPF: ABNORMAL /HPF
UROBILINOGEN UR QL STRIP: ABNORMAL
WBC # UR STRIP: ABNORMAL /HPF
WBC NRBC COR # BLD AUTO: 9.88 10*3/MM3 (ref 3.4–10.8)
WHOLE BLOOD HOLD COAG: NORMAL
WHOLE BLOOD HOLD SPECIMEN: NORMAL

## 2025-02-09 PROCEDURE — 85025 COMPLETE CBC W/AUTO DIFF WBC: CPT | Performed by: PHYSICIAN ASSISTANT

## 2025-02-09 PROCEDURE — 80053 COMPREHEN METABOLIC PANEL: CPT | Performed by: PHYSICIAN ASSISTANT

## 2025-02-09 PROCEDURE — 81001 URINALYSIS AUTO W/SCOPE: CPT | Performed by: PHYSICIAN ASSISTANT

## 2025-02-09 PROCEDURE — 93010 ELECTROCARDIOGRAM REPORT: CPT | Performed by: INTERNAL MEDICINE

## 2025-02-09 PROCEDURE — 99283 EMERGENCY DEPT VISIT LOW MDM: CPT

## 2025-02-09 PROCEDURE — 93005 ELECTROCARDIOGRAM TRACING: CPT | Performed by: PHYSICIAN ASSISTANT

## 2025-02-09 PROCEDURE — 36415 COLL VENOUS BLD VENIPUNCTURE: CPT

## 2025-02-10 NOTE — ED PROVIDER NOTES
Subjective   History of Present Illness  65-year-old male with aortic valve disease, hypertension, hyperlipidemia presenting to the ED due to high blood pressure.  He states he has been monitoring his blood pressure frequently and he noticed his blood pressure was higher than usual today.  Around 6:00 this evening he had a blood pressure as high as 190/110 which scared him so he decided to come in to get checked out.  At bedside he has no complaints he is denying any chest pain any shortness of breath any nausea vomiting or headache.      Review of Systems   Constitutional: Negative.  Negative for fever.   HENT: Negative.     Respiratory: Negative.     Cardiovascular: Negative.  Negative for chest pain.   Gastrointestinal: Negative.  Negative for abdominal pain.   Endocrine: Negative.    Genitourinary: Negative.  Negative for dysuria.   Skin: Negative.    Neurological: Negative.    Psychiatric/Behavioral: Negative.     All other systems reviewed and are negative.      Past Medical History:   Diagnosis Date    Aortic valve disease     Cancer     testicular    Elevated hemoglobin A1c     Heart murmur     [place] (hard of hearing)     hearing aides    Hyperlipidemia     Hypertension     Small bowel obstruction     Wears dentures     top    Wears glasses        No Known Allergies    Past Surgical History:   Procedure Laterality Date    AORTIC VALVE REPAIR/REPLACEMENT N/A 4/12/2021    Procedure: TRANSCATHETER AORTIC VALVE REPLACEMENT;  Surgeon: Fidel Sandoval MD;  Location: [facility];  Service: Cardiothoracic;  Laterality: N/A;  90mls [id number]  8mins 30 secs  97mGy    AORTIC VALVE REPAIR/REPLACEMENT N/A 4/12/2021    Procedure: TRANSCATHETER AORTIC VALVE INSERTION;  Surgeon: Arie Nicholas MD;  Location: [facility];  Service: Cardiovascular;  Laterality: N/A;    CARDIAC CATHETERIZATION N/A 3/26/2021    Procedure: Right and Left Heart Cath;  Surgeon: Emre Guzmán MD;  Location: [facility] CATH INVASIVE  LOCATION;  Service: Cardiology;  Laterality: N/A;    COLONOSCOPY      SMALL INTESTINE SURGERY      2005    TESTICLE SURGERY      TRANSESOPHAGEAL ECHOCARDIOGRAM (NIKKO) N/A 4/12/2021    Procedure: TRANSESOPHAGEAL ECHOCARDIOGRAM WITH ANESTHESIA;  Surgeon: Fidel Sandoval MD;  Location: [facility];  Service: Cardiothoracic;  Laterality: N/A;       Family History   Problem Relation Age of Onset    Diabetes Mother     Heart disease Mother     Heart disease Father     Stroke Brother     No Known Problems Brother        Social History     Socioeconomic History    Marital status:      Spouse name: Deloris    Number of children: 1    Years of education: HS   Tobacco Use    Smoking status: Former     Current packs/day: 0.00     Average packs/day: 1 pack/day for 30.0 years (30.0 ttl pk-yrs)     Types: Cigarettes     Start date: 2/2/1980     Quit date: 2/2/2010     Years since quitting: 15.0    Smokeless tobacco: Never   Substance and Sexual Activity    Alcohol use: No    Drug use: Never    Sexual activity: Defer           Objective   Physical Exam  Vitals and nursing note reviewed.   Constitutional:       General: He is not in acute distress.     Appearance: He is well-developed. He is not diaphoretic.   HENT:      Head: Normocephalic and atraumatic.      Right Ear: External ear normal.      Left Ear: External ear normal.      Nose: Nose normal.   Eyes:      Conjunctiva/sclera: Conjunctivae normal.      Pupils: Pupils are equal, round, and reactive to light.   Neck:      Vascular: No JVD.      Trachea: No tracheal deviation.   Cardiovascular:      Rate and Rhythm: Normal rate and regular rhythm.      Heart sounds: Normal heart sounds. No murmur heard.  Pulmonary:      Effort: Pulmonary effort is normal. No respiratory distress.      Breath sounds: Normal breath sounds. No wheezing.   Abdominal:      General: Bowel sounds are normal.      Palpations: Abdomen is soft.      Tenderness: There is no abdominal tenderness.    Musculoskeletal:         General: No deformity. Normal range of motion.      Cervical back: Normal range of motion and neck supple.   Skin:     General: Skin is warm and dry.      Coloration: Skin is not pale.      Findings: No erythema or rash.   Neurological:      Mental Status: He is alert and oriented to person, place, and time.      Cranial Nerves: No cranial nerve deficit.   Psychiatric:         Behavior: Behavior normal.         Thought Content: Thought content normal.         Procedures           ED Course  ED Course as of 02/09/25 2032   Sun Feb 09, 2025 1947 EKG interpretation normal sinus rhythm 73 bpm QTc is 467 right bundle branch block no ST elevations or depressions.  Electronically signed by Gavin Cavazos DO, 02/09/25, 7:48 PM EST.   [GF]      ED Course User Index  [GF] Gavin Cavazos DO                                                       Medical Decision Making  Patient's blood pressure was unremarkable while in the ED.  Patient has no complaints of chest pain shortness of breath nausea vomiting or even headache.  He is urinalysis does appear to show a mild UTI however patient just completed antibiotic therapy.  He denies any dysuria or urinary troubles.  Patient feels well and is ready to be discharged at this time.    Problems Addressed:  Hypertension, unspecified type: complicated acute illness or injury    Amount and/or Complexity of Data Reviewed  Labs: ordered.  ECG/medicine tests: ordered.        Final diagnoses:   Hypertension, unspecified type       ED Disposition  ED Disposition       ED Disposition   Discharge    Condition   Stable    Comment   --               Jonathan Fernandez MD  [address] [address]  [phone]    Schedule an appointment as soon as possible for a visit in 1 week      [facility] EMERGENCY DEPARTMENT  [address] [place] [address] [id number] [phone]  Go to   If symptoms worsen         Medication List      No  changes were made to your prescriptions during this visit.            Gavin Cavazos,   02/09/25 3702

## 2025-02-10 NOTE — DISCHARGE INSTRUCTIONS
Your blood pressure was stable while in the ED.  Your workup was unremarkable.  Keep taking your blood pressure medications as prescribed.  Follow-up with your PCP.

## 2025-02-26 ENCOUNTER — PATIENT ROUNDING (BHMG ONLY) (OUTPATIENT)
Dept: SURGERY | Facility: CLINIC | Age: 65
End: 2025-02-26
Payer: MEDICARE

## 2025-02-26 ENCOUNTER — OFFICE VISIT (OUTPATIENT)
Dept: SURGERY | Facility: CLINIC | Age: 65
End: 2025-02-26
Payer: MEDICARE

## 2025-02-26 VITALS
WEIGHT: 230.6 LBS | BODY MASS INDEX: 31.23 KG/M2 | DIASTOLIC BLOOD PRESSURE: 78 MMHG | SYSTOLIC BLOOD PRESSURE: 126 MMHG | HEIGHT: 72 IN

## 2025-02-26 DIAGNOSIS — N62 GYNECOMASTIA, MALE: Primary | ICD-10-CM

## 2025-02-26 RX ORDER — CIPROFLOXACIN 500 MG/1
TABLET, FILM COATED ORAL
COMMUNITY
Start: 2025-01-03

## 2025-02-26 RX ORDER — METFORMIN HYDROCHLORIDE 500 MG/1
1 TABLET, EXTENDED RELEASE ORAL
COMMUNITY
Start: 2025-02-25

## 2025-02-26 NOTE — PROGRESS NOTES
February 26, 2025    Hello, may I speak with Fidel Roberson?    My name is Tammy Kelly      I am  with [facility]  [address] [address]  [place] [address] [phone].    Before we get started may I verify your date of birth? 1960    I am calling to officially welcome you to our practice and ask about your recent visit. Is this a good time to talk? yes    Tell me about your visit with us. What things went well?  He thanked us. He was satisfied with visit.       We're always looking for ways to make our patients' experiences even better. Do you have recommendations on ways we may improve?  no    Overall were you satisfied with your first visit to our practice? yes       I appreciate you taking the time to speak with me today. Is there anything else I can do for you? no      Thank you, and have a great day.

## 2025-02-26 NOTE — PROGRESS NOTES
Subjective   Fidel Roberson is a 65 y.o. male is being seen for consultation today at the request of Jonathan Fernandez MD    Fidel Roberson is a 65 y.o. male with development of right sided retroareolar breast pain and small nodule likely representing gynecomastia.  This is unilateral.  Patient denies any nipple discharge.  No family history of breast cancer.  Patient has a history of TAVR in the past.  History of Present Illness      Past Medical History:   Diagnosis Date    Aortic valve disease     Cancer     testicular    Elevated hemoglobin A1c     Heart murmur     [place] (hard of hearing)     hearing aides    Hyperlipidemia     Hypertension     Small bowel obstruction     Wears dentures     top    Wears glasses        Family History   Problem Relation Age of Onset    Diabetes Mother     Heart disease Mother     Heart disease Father     Stroke Brother     No Known Problems Brother        Social History     Socioeconomic History    Marital status:      Spouse name: Deloris    Number of children: 1    Years of education: HS   Tobacco Use    Smoking status: Former     Current packs/day: 0.00     Average packs/day: 1 pack/day for 30.0 years (30.0 ttl pk-yrs)     Types: Cigarettes     Start date: 2/2/1980     Quit date: 2/2/2010     Years since quitting: 15.0    Smokeless tobacco: Never   Vaping Use    Vaping status: Never Used   Substance and Sexual Activity    Alcohol use: No    Drug use: Never    Sexual activity: Defer       Past Surgical History:   Procedure Laterality Date    AORTIC VALVE REPAIR/REPLACEMENT N/A 4/12/2021    Procedure: TRANSCATHETER AORTIC VALVE REPLACEMENT;  Surgeon: Fidel Sandoval MD;  Location:  [facility];  Service: Cardiothoracic;  Laterality: N/A;  90mls [id number]  8mins 30 secs  97mGy    AORTIC VALVE REPAIR/REPLACEMENT N/A 4/12/2021    Procedure: TRANSCATHETER AORTIC VALVE INSERTION;  Surgeon: Arie Nicholas MD;  Location:  [facility];  Service: Cardiovascular;   "Laterality: N/A;    CARDIAC CATHETERIZATION N/A 3/26/2021    Procedure: Right and Left Heart Cath;  Surgeon: Emre Guzmán MD;  [facility] CATH INVASIVE [facility];  Service: Cardiology;  Laterality: N/A;    COLONOSCOPY      SMALL INTESTINE SURGERY      2005    TESTICLE SURGERY      TRANSESOPHAGEAL ECHOCARDIOGRAM (NIKKO) N/A 4/12/2021    Procedure: TRANSESOPHAGEAL ECHOCARDIOGRAM WITH ANESTHESIA;  Surgeon: Fidel Sandoval MD;  Location: [facility];  Service: Cardiothoracic;  Laterality: N/A;       Review of Systems   Constitutional:  Negative for activity change, appetite change, chills and fever.   HENT:  Negative for sore throat and trouble swallowing.    Eyes:  Negative for visual disturbance.   Respiratory:  Negative for cough and shortness of breath.    Cardiovascular:  Negative for chest pain and palpitations.   Gastrointestinal:  Negative for abdominal distention, abdominal pain, blood in stool, constipation, diarrhea, nausea and vomiting.   Endocrine: Negative for cold intolerance and heat intolerance.   Genitourinary:  Negative for dysuria.   Musculoskeletal:  Negative for joint swelling.   Skin:  Negative for color change, rash and wound.   Allergic/Immunologic: Negative for immunocompromised state.   Neurological:  Negative for dizziness, seizures, weakness and headaches.   Hematological:  Negative for adenopathy. Does not bruise/bleed easily.   Psychiatric/Behavioral:  Negative for agitation and confusion.        Results        /78   Ht 182.9 cm (72\")   Wt 105 kg (230 lb 9.6 oz)   BMI 31.27 kg/m²   Objective   Physical Exam  Constitutional:       Appearance: He is well-developed.   HENT:      Head: Normocephalic and atraumatic.   Eyes:      Conjunctiva/sclera: Conjunctivae normal.      Pupils: Pupils are equal, round, and reactive to light.   Neck:      Thyroid: No thyromegaly.      Vascular: No JVD.      Trachea: No tracheal deviation.   Cardiovascular:      Rate and Rhythm: Normal " rate and regular rhythm.      Heart sounds: No murmur heard.     No friction rub. No gallop.   Pulmonary:      Effort: Pulmonary effort is normal.      Breath sounds: Normal breath sounds.   Chest:          Comments: Firm tender nodule retroareolar right region  Abdominal:      General: There is no distension.      Palpations: Abdomen is soft. There is no hepatomegaly or splenomegaly.      Tenderness: There is no abdominal tenderness.      Hernia: No hernia is present.   Musculoskeletal:         General: No deformity. Normal range of motion.      Cervical back: Neck supple.   Skin:     General: Skin is warm and dry.   Neurological:      Mental Status: He is alert and oriented to person, place, and time.       Physical Exam      Assessment & Plan            Assessment   Diagnoses and all orders for this visit:    1. Gynecomastia, male (Primary)  -     Mammo Diagnostic Right With CAD; Future        Assessment & Plan      Fidel Roberson is a 65 y.o. male with right retroareolar breast pain likely representing gynecomastia.  Mammogram will be performed and he will follow-up after imaging.    BMI is >= 30 and <35. (Class 1 Obesity). The following options were offered after discussion;: weight loss educational material (shared in after visit summary)            This document has been electronically signed by Daniel Hartman MD   February 26, 2025 13:15 EST    Patient or patient representative verbalized consent for the use of Ambient Listening during the visit with  [unfilled] for chart documentation. 2/26/2025  15:22 EST

## 2025-02-27 ENCOUNTER — DOCUMENTATION (OUTPATIENT)
Dept: SURGERY | Facility: CLINIC | Age: 65
End: 2025-02-27
Payer: MEDICARE

## 2025-02-27 NOTE — PROGRESS NOTES
Patient has decided that he would like to hold off on the mammogram. He will call our office if he is in need of anything

## (undated) DEVICE — SUT PROLN 7/0 BV1 24IN 4PK M8702

## (undated) DEVICE — PROVIDES A STERILE INTERFACE BETWEEN THE OPERATING ROOM SURGICAL LAMPS (NON-STERILE) AND THE SURGEON OR NURSE (STERILE).: Brand: STERION®CLAMP COVER FABRIC

## (undated) DEVICE — PATIENT RETURN ELECTRODE, SINGLE-USE, CONTACT QUALITY MONITORING, ADULT, WITH 9FT CORD, FOR PATIENTS WEIGING OVER 33LBS. (15KG): Brand: MEGADYNE

## (undated) DEVICE — GW INQWIRE FC PTFE J/3MM .035 180

## (undated) DEVICE — GW AMPLTZ SUPERSTIFF STR .035IN 180CM

## (undated) DEVICE — PENCL ROCKRSWCH MEGADYNE W/HOLSTR 10FT SS

## (undated) DEVICE — SHEATH INTRO SUPERSHEATH JWIRE .035 7F 11CM

## (undated) DEVICE — APPL CHLORAPREP TINTED 26ML TEAL

## (undated) DEVICE — PINNACLE R/O II INTRODUCER SHEATH WITH RADIOPAQUE MARKER: Brand: PINNACLE

## (undated) DEVICE — CATH GUIDE BERN 5F 65CM

## (undated) DEVICE — ELECTRD NDL EZ CLN STD 2.75IN

## (undated) DEVICE — SUT SILK 3/0 TIES 18IN A184H

## (undated) DEVICE — SUT SILK 2/0 TIES 18IN A185H

## (undated) DEVICE — GLV SURG PREMIERPRO MIC LTX PF SZ7.5 BRN

## (undated) DEVICE — ST INF PRI SMRTSTE 20DRP 2VLV 24ML 117

## (undated) DEVICE — CATH F5 INF JL 4 100CM: Brand: INFINITI

## (undated) DEVICE — GLV SURG SENSICARE PI LF PF 6.5

## (undated) DEVICE — GW CERBRL FC PTFE STD/STR .035 260CM

## (undated) DEVICE — SUCTION CANISTER, 2500CC, RIGID: Brand: DEROYAL

## (undated) DEVICE — PK MINOR SPLT 10

## (undated) DEVICE — CATH F5 INF 3DRC 100CM: Brand: INFINITI

## (undated) DEVICE — CANNULA,OXY,ADULT,SUPER SOFT,W/14'TUB,UC: Brand: MEDLINE INDUSTRIES, INC.

## (undated) DEVICE — 3M™ STERI-DRAPE™ INSTRUMENT POUCH 1018: Brand: STERI-DRAPE™

## (undated) DEVICE — CATH F5 INF JR 4 100CM: Brand: INFINITI

## (undated) DEVICE — ELECTRD DEFIB M/FUNC PROPADZ STRL 2PK

## (undated) DEVICE — GLV SURG BIOGELULTRATOUCH POLYISPRN PF LF SZ7 STRL

## (undated) DEVICE — GLV SURG PREMIERPRO MIC LTX PF SZ6.5 BRN

## (undated) DEVICE — SLV REPOSTNG CATH STRL 60CM

## (undated) DEVICE — ADHS SKIN PREMIERPRO EXOFIN TOPICAL HI/VISC .5ML

## (undated) DEVICE — 3M™ IOBAN™ 2 ANTIMICROBIAL INCISE DRAPE 6651EZ: Brand: IOBAN™ 2

## (undated) DEVICE — PINNACLE INTRODUCER SHEATH: Brand: PINNACLE

## (undated) DEVICE — GLIDEX™ COATED HYDROPHILIC GUIDEWIRE: Brand: MAGIC TORQUE™

## (undated) DEVICE — BLANKT WARM UNDER/BDY A/ 100X195CM DISP LF

## (undated) DEVICE — ST EXT IV SMARTSITE 2VLV SP M LL 5ML IV1

## (undated) DEVICE — NDL PERC 1PRT THNWALL W/BASEPLT 18G 7CM

## (undated) DEVICE — COVER,MAYO STAND,XL,STERILE: Brand: MEDLINE

## (undated) DEVICE — ADULT DISPOSABLE SINGLE-PATIENT USE PULSE OXIMETER SENSOR: Brand: NONIN

## (undated) DEVICE — DECANT BG O JET

## (undated) DEVICE — PAD ARMBRD SURG CONVOL 7.5X20X2IN

## (undated) DEVICE — GLV SURG PREMIERPRO MIC LTX PF SZ8 BRN

## (undated) DEVICE — SWAN-GANZ TRUE SIZE THERMODILUTION "S" TIP: Brand: SWAN-GANZ TRUE SIZE

## (undated) DEVICE — CATH F5 INF PIG145 110CM 6SH: Brand: INFINITI

## (undated) DEVICE — SHEET, DRAPE, SPLIT, STERILE: Brand: MEDLINE

## (undated) DEVICE — GW PERIPH VASC ADX J/TP SS .035 150CM 3MM

## (undated) DEVICE — SUT PROLN 4/0 BB D/A 36IN 8581H

## (undated) DEVICE — GLV SURG SENSICARE PI LF PF 7.0

## (undated) DEVICE — SUT SILK 4/0 TIES 18IN A183H

## (undated) DEVICE — KT MANIFOLD CATHLAB CUST

## (undated) DEVICE — SKIN PREP TRAY W/CHG: Brand: MEDLINE INDUSTRIES, INC.

## (undated) DEVICE — SHEATH INTRO SUPERSHEATH JWIRE .035 6F 11CM

## (undated) DEVICE — HDRST POSTIN FM CRDL TRACH SLOT 9X8X4IN

## (undated) DEVICE — COVER,TABLE,HVY DUTY,60"X90",STRL: Brand: MEDLINE

## (undated) DEVICE — RADIFOCUS GLIDEWIRE: Brand: GLIDEWIRE

## (undated) DEVICE — PK CATH CARD 70

## (undated) DEVICE — PK ATS CUST W CARDIOTOMY RESEVOIR

## (undated) DEVICE — SYR LUERLOK 50ML

## (undated) DEVICE — SUT MNCRYL PLS ANTIB UD 4/0 PS2 18IN

## (undated) DEVICE — GOWN,NON-REINFORCED,SIRUS,SET IN SLV,XL: Brand: MEDLINE

## (undated) DEVICE — MYNXGRIP 6F/7F: Brand: MYNXGRIP

## (undated) DEVICE — CATH DIAG EXPO .056 AL1 6F 100CM

## (undated) DEVICE — ANTIBACTERIAL UNDYED BRAIDED (POLYGLACTIN 910), SYNTHETIC ABSORBABLE SUTURE: Brand: COATED VICRYL

## (undated) DEVICE — GW DIAG INQWIRE FC PTFE J/3MM .025IN 150CM

## (undated) DEVICE — ANGIOGRAPHIC CATHETER: Brand: EXPO™

## (undated) DEVICE — GLV SURG PREMIERPRO MIC LTX PF SZ7 BRN

## (undated) DEVICE — Device

## (undated) DEVICE — GLV SURG SENSICARE PI LF PF 7.5

## (undated) DEVICE — SI AVANTI+ 7F STD W/GW  NO OBT: Brand: AVANTI

## (undated) DEVICE — SUT PROLN 6/0 C1 D/A 30IN 8706H

## (undated) DEVICE — BOWL UTIL STRL 32OZ

## (undated) DEVICE — GW SAFARI2 PRESH XSM CRV .035IN 3.2X2.9X275CM

## (undated) DEVICE — CATH PACE PACEL BIPOL 5F110CM

## (undated) DEVICE — SYR LUERLOK 30CC

## (undated) DEVICE — 3M™ STERI-DRAPE™ FLUOROSCOPE DRAPE, 10 PER CARTON / 4 CARTONS PER CASE, 1012: Brand: STERI-DRAPE™

## (undated) DEVICE — 3M™ TEGADERM™ HIGH PERFORMANCE FOAM ADHESIVE DRESSING 90619, HEELDESIGN, 5 EACH/CARTON, 4 CARTON/CASE: Brand: 3M™ TEGADERM™

## (undated) DEVICE — CABL PACE ATRIAL PT BLU